# Patient Record
Sex: FEMALE
[De-identification: names, ages, dates, MRNs, and addresses within clinical notes are randomized per-mention and may not be internally consistent; named-entity substitution may affect disease eponyms.]

---

## 2022-03-27 ENCOUNTER — NURSE TRIAGE (OUTPATIENT)
Dept: OTHER | Facility: CLINIC | Age: 67
End: 2022-03-27

## 2022-03-27 NOTE — TELEPHONE ENCOUNTER
Subjective: Caller states \"nosebleed for the first time since I was 20. I have been having issues with sinuses recently\"     Hx HTN     BP checked during triage  152/111   138/112     Takes Amlodipine 2.5mg 1x a day 9    Current Symptoms:   bleeding from right nostril only. \"Maybe 1/2 cup blood loss\"    Onset: 5 minutes ago     Associated Symptoms: NA    Pain Severity: N/A    Temperature: N/A    What has been tried: pinch bridge of nose, cold washcloth     LMP: NA Pregnant: NA    Recommended disposition: See PCP within 24 Hours    Care advice provided, patient verbalizes understanding; denies any other questions or concerns; instructed to call back for any new or worsening symptoms. Patient/caller agrees to follow-up with PCP     This triage is a result of a call to [address] [place] [address]. Please do not respond to the triage nurse through this encounter. Any subsequent communication should be directly with the patient.     Reason for Disposition   [1] Large amount of blood has been lost (e.g., 1 cup or 240 ml) AND [2] bleeding now controlled (stopped)   Systolic BP  >= 561 OR Diastolic >= 387    Protocols used: NOSEBLEED-ADULT-AH, BLOOD PRESSURE - HIGH-ADULT-AH

## 2023-04-28 ENCOUNTER — OFFICE VISIT (OUTPATIENT)
Dept: PRIMARY CARE | Facility: CLINIC | Age: 68
End: 2023-04-28
Payer: MEDICARE

## 2023-04-28 VITALS
WEIGHT: 164 LBS | HEART RATE: 72 BPM | DIASTOLIC BLOOD PRESSURE: 80 MMHG | SYSTOLIC BLOOD PRESSURE: 134 MMHG | BODY MASS INDEX: 27.29 KG/M2

## 2023-04-28 DIAGNOSIS — J45.20 MILD INTERMITTENT ASTHMA, UNSPECIFIED WHETHER COMPLICATED (HHS-HCC): Primary | ICD-10-CM

## 2023-04-28 DIAGNOSIS — M25.562 ARTHRALGIA OF LEFT KNEE: ICD-10-CM

## 2023-04-28 DIAGNOSIS — E78.2 HYPERLIPEMIA, MIXED: ICD-10-CM

## 2023-04-28 PROBLEM — I35.9 AORTIC VALVE CALCIFICATION: Status: ACTIVE | Noted: 2023-04-28

## 2023-04-28 PROBLEM — I10 BENIGN ESSENTIAL HYPERTENSION: Status: ACTIVE | Noted: 2023-04-28

## 2023-04-28 PROBLEM — M25.569 JOINT PAIN, KNEE: Status: ACTIVE | Noted: 2023-04-28

## 2023-04-28 PROBLEM — J45.909 ASTHMA (HHS-HCC): Status: ACTIVE | Noted: 2023-04-28

## 2023-04-28 PROCEDURE — 1159F MED LIST DOCD IN RCRD: CPT | Performed by: INTERNAL MEDICINE

## 2023-04-28 PROCEDURE — 3075F SYST BP GE 130 - 139MM HG: CPT | Performed by: INTERNAL MEDICINE

## 2023-04-28 PROCEDURE — 1036F TOBACCO NON-USER: CPT | Performed by: INTERNAL MEDICINE

## 2023-04-28 PROCEDURE — 1160F RVW MEDS BY RX/DR IN RCRD: CPT | Performed by: INTERNAL MEDICINE

## 2023-04-28 PROCEDURE — 3079F DIAST BP 80-89 MM HG: CPT | Performed by: INTERNAL MEDICINE

## 2023-04-28 PROCEDURE — 99213 OFFICE O/P EST LOW 20 MIN: CPT | Performed by: INTERNAL MEDICINE

## 2023-04-28 SDOH — HEALTH STABILITY: PHYSICAL HEALTH: ON AVERAGE, HOW MANY DAYS PER WEEK DO YOU ENGAGE IN MODERATE TO STRENUOUS EXERCISE (LIKE A BRISK WALK)?: 3 DAYS

## 2023-04-28 SDOH — HEALTH STABILITY: PHYSICAL HEALTH: ON AVERAGE, HOW MANY MINUTES DO YOU ENGAGE IN EXERCISE AT THIS LEVEL?: 60 MIN

## 2023-04-28 ASSESSMENT — LIFESTYLE VARIABLES
HOW OFTEN DO YOU HAVE A DRINK CONTAINING ALCOHOL: MONTHLY OR LESS
HOW OFTEN DO YOU HAVE SIX OR MORE DRINKS ON ONE OCCASION: NEVER
AUDIT-C TOTAL SCORE: 1
SKIP TO QUESTIONS 9-10: 1
HOW MANY STANDARD DRINKS CONTAINING ALCOHOL DO YOU HAVE ON A TYPICAL DAY: 1 OR 2

## 2023-04-28 ASSESSMENT — PATIENT HEALTH QUESTIONNAIRE - PHQ9
2. FEELING DOWN, DEPRESSED OR HOPELESS: NOT AT ALL
SUM OF ALL RESPONSES TO PHQ9 QUESTIONS 1 & 2: 0
1. LITTLE INTEREST OR PLEASURE IN DOING THINGS: NOT AT ALL

## 2023-04-28 ASSESSMENT — ENCOUNTER SYMPTOMS: ARTHRALGIAS: 1

## 2023-04-28 NOTE — PROGRESS NOTES
Subjective   Patient ID: Stephany Lara is a 67 y.o. female who presents for No chief complaint on file..    Patient presents for follow-up..  She has been complaining of increased stress in her life.  She reports that her blood pressure has been slightly elevated.  She denies any headache no dizziness, no sinus problems, no chest pain or palpitations.  She denies abdominal pain no nausea vomiting or diarrhea.  She has been compliant with her diet and exercise.  She has been complaining of worsening asthma symptoms over the past few months.           Review of Systems   Musculoskeletal:  Positive for arthralgias.       Objective   /80   Pulse 72   Wt 74.4 kg (164 lb)   BMI 27.29 kg/m²     Physical Exam  Constitutional:       Appearance: Normal appearance.   Cardiovascular:      Rate and Rhythm: Normal rate and regular rhythm.      Heart sounds: No murmur heard.     No gallop.   Pulmonary:      Effort: No respiratory distress.      Breath sounds: No wheezing or rales.   Abdominal:      General: There is no distension.      Palpations: There is no mass.      Tenderness: There is no abdominal tenderness. There is no guarding.   Musculoskeletal:      Right lower leg: No edema.      Left lower leg: No edema.   Neurological:      Mental Status: She is alert.         Assessment/Plan   Diagnoses and all orders for this visit:  Mild intermittent asthma, unspecified whether complicated patient not interested in taking any steroids.  Will monitor symptoms.  Hypertension-stable off of medications  Arthralgia of left knee-symptoms have been stable  Hyperlipemia, mixed-she does not want medication.  Diet and exercise  Health maintenance-colonoscopy has been done.  Immunizations are up-to-date.  Mammogram has been done.  Bone density is up-to-date.  Pap with GYN

## 2023-04-28 NOTE — PATIENT INSTRUCTIONS
Please take medication as prescribed.  Diet and exercise.  Follow-up in 3 months.  Monitor your blood pressure at home.

## 2023-12-05 ENCOUNTER — TELEPHONE (OUTPATIENT)
Dept: PRIMARY CARE | Facility: CLINIC | Age: 68
End: 2023-12-05

## 2024-01-22 ENCOUNTER — OFFICE VISIT (OUTPATIENT)
Dept: PRIMARY CARE | Facility: CLINIC | Age: 69
End: 2024-01-22
Payer: MEDICARE

## 2024-01-22 VITALS
SYSTOLIC BLOOD PRESSURE: 134 MMHG | HEART RATE: 72 BPM | BODY MASS INDEX: 28.32 KG/M2 | HEIGHT: 65 IN | DIASTOLIC BLOOD PRESSURE: 76 MMHG | WEIGHT: 170 LBS

## 2024-01-22 DIAGNOSIS — E55.9 VITAMIN D DEFICIENCY: ICD-10-CM

## 2024-01-22 DIAGNOSIS — Z12.31 ENCOUNTER FOR SCREENING MAMMOGRAM FOR MALIGNANT NEOPLASM OF BREAST: Primary | ICD-10-CM

## 2024-01-22 DIAGNOSIS — Z78.0 MENOPAUSE: ICD-10-CM

## 2024-01-22 DIAGNOSIS — J45.20 MILD INTERMITTENT ASTHMA, UNSPECIFIED WHETHER COMPLICATED (HHS-HCC): ICD-10-CM

## 2024-01-22 DIAGNOSIS — E78.2 HYPERLIPEMIA, MIXED: ICD-10-CM

## 2024-01-22 DIAGNOSIS — I10 BENIGN ESSENTIAL HYPERTENSION: ICD-10-CM

## 2024-01-22 DIAGNOSIS — R53.81 MALAISE: ICD-10-CM

## 2024-01-22 PROCEDURE — 3075F SYST BP GE 130 - 139MM HG: CPT | Performed by: INTERNAL MEDICINE

## 2024-01-22 PROCEDURE — 3078F DIAST BP <80 MM HG: CPT | Performed by: INTERNAL MEDICINE

## 2024-01-22 PROCEDURE — G0439 PPPS, SUBSEQ VISIT: HCPCS | Performed by: INTERNAL MEDICINE

## 2024-01-22 PROCEDURE — 1159F MED LIST DOCD IN RCRD: CPT | Performed by: INTERNAL MEDICINE

## 2024-01-22 PROCEDURE — 1126F AMNT PAIN NOTED NONE PRSNT: CPT | Performed by: INTERNAL MEDICINE

## 2024-01-22 PROCEDURE — 1170F FXNL STATUS ASSESSED: CPT | Performed by: INTERNAL MEDICINE

## 2024-01-22 PROCEDURE — 1160F RVW MEDS BY RX/DR IN RCRD: CPT | Performed by: INTERNAL MEDICINE

## 2024-01-22 PROCEDURE — 1036F TOBACCO NON-USER: CPT | Performed by: INTERNAL MEDICINE

## 2024-01-22 PROCEDURE — 99214 OFFICE O/P EST MOD 30 MIN: CPT | Performed by: INTERNAL MEDICINE

## 2024-01-22 RX ORDER — AMLODIPINE BESYLATE 2.5 MG/1
1 TABLET ORAL DAILY
COMMUNITY
Start: 2020-11-12 | End: 2024-01-22 | Stop reason: WASHOUT

## 2024-01-22 ASSESSMENT — ENCOUNTER SYMPTOMS
BRUISES/BLEEDS EASILY: 0
PALPITATIONS: 0
DIARRHEA: 0
LIGHT-HEADEDNESS: 0
SEIZURES: 0
NAUSEA: 0
WHEEZING: 0
HEADACHES: 0
DYSURIA: 0
ABDOMINAL DISTENTION: 0
NUMBNESS: 0
EYE PAIN: 0
ADENOPATHY: 0
RECTAL PAIN: 0
EYE REDNESS: 0
WEAKNESS: 0
TREMORS: 0
CHOKING: 0
NECK PAIN: 0
FACIAL SWELLING: 0
BACK PAIN: 0
COLOR CHANGE: 0
SINUS PRESSURE: 0
DIFFICULTY URINATING: 0
VOMITING: 0
DIAPHORESIS: 0
FLANK PAIN: 0
HEMATURIA: 0
SHORTNESS OF BREATH: 0
ANAL BLEEDING: 0
STRIDOR: 0
FREQUENCY: 0
FATIGUE: 0
ARTHRALGIAS: 0
ACTIVITY CHANGE: 0
NECK STIFFNESS: 0
MYALGIAS: 0
SINUS PAIN: 0
WOUND: 0
CHEST TIGHTNESS: 0
BLOOD IN STOOL: 0
PHOTOPHOBIA: 0
CHILLS: 0
VOICE CHANGE: 0
POLYDIPSIA: 0
EYE ITCHING: 0
SLEEP DISTURBANCE: 0
APPETITE CHANGE: 0
SPEECH DIFFICULTY: 0
FACIAL ASYMMETRY: 0
COUGH: 0
TROUBLE SWALLOWING: 0
POLYPHAGIA: 0
RHINORRHEA: 0
DIZZINESS: 0
SORE THROAT: 0
JOINT SWELLING: 0
EYE DISCHARGE: 0
CONSTIPATION: 0
ABDOMINAL PAIN: 0

## 2024-01-22 ASSESSMENT — PATIENT HEALTH QUESTIONNAIRE - PHQ9
1. LITTLE INTEREST OR PLEASURE IN DOING THINGS: NOT AT ALL
2. FEELING DOWN, DEPRESSED OR HOPELESS: NOT AT ALL
SUM OF ALL RESPONSES TO PHQ9 QUESTIONS 1 AND 2: 0

## 2024-01-22 ASSESSMENT — ACTIVITIES OF DAILY LIVING (ADL)
DRESSING: INDEPENDENT
TAKING_MEDICATION: INDEPENDENT
BATHING: INDEPENDENT
DOING_HOUSEWORK: INDEPENDENT
GROCERY_SHOPPING: INDEPENDENT
MANAGING_FINANCES: INDEPENDENT
BATHING: INDEPENDENT
DRESSING: INDEPENDENT

## 2024-01-22 NOTE — PROGRESS NOTES
Subjective   Patient ID: Stephany Lara is a 68 y.o. female who presents for Medicare Annual Wellness Visit Subsequent.    Patient presents for wellness exam and follow-up.  She has not been taking her blood pressure medications.  She has been compliant with her diet and exercise.  She reports that her blood pressures been okay at home.  She denies any symptoms from asthma she overall feels well.  She reports occasional headaches, no dizziness, sinus problems, no chest pain or shortness of breath.  She denies abdominal pain no nausea vomiting or diarrhea.  She reports no new musculoskeletal complaints.         Review of Systems   Constitutional:  Negative for activity change, appetite change, chills, diaphoresis and fatigue.   HENT:  Negative for congestion, dental problem, drooling, ear discharge, ear pain, facial swelling, hearing loss, mouth sores, nosebleeds, postnasal drip, rhinorrhea, sinus pressure, sinus pain, sneezing, sore throat, tinnitus, trouble swallowing and voice change.    Eyes:  Negative for photophobia, pain, discharge, redness, itching and visual disturbance.   Respiratory:  Negative for cough, choking, chest tightness, shortness of breath, wheezing and stridor.    Cardiovascular:  Negative for chest pain, palpitations and leg swelling.   Gastrointestinal:  Negative for abdominal distention, abdominal pain, anal bleeding, blood in stool, constipation, diarrhea, nausea, rectal pain and vomiting.   Endocrine: Negative for cold intolerance, heat intolerance, polydipsia, polyphagia and polyuria.   Genitourinary:  Negative for decreased urine volume, difficulty urinating, dysuria, enuresis, flank pain, frequency, genital sores, hematuria and urgency.   Musculoskeletal:  Negative for arthralgias, back pain, gait problem, joint swelling, myalgias, neck pain and neck stiffness.   Skin:  Negative for color change, pallor, rash and wound.   Neurological:  Negative for dizziness, tremors, seizures,  "syncope, facial asymmetry, speech difficulty, weakness, light-headedness, numbness and headaches.   Hematological:  Negative for adenopathy. Does not bruise/bleed easily.   Psychiatric/Behavioral:  Negative for sleep disturbance.        Objective   /76   Pulse 72   Ht 1.651 m (5' 5\")   Wt 77.1 kg (170 lb)   BMI 28.29 kg/m²     Physical Exam  Constitutional:       Appearance: Normal appearance.   Cardiovascular:      Rate and Rhythm: Normal rate and regular rhythm.      Heart sounds: No murmur heard.     No gallop.   Pulmonary:      Effort: No respiratory distress.      Breath sounds: No wheezing or rales.   Abdominal:      General: There is no distension.      Palpations: There is no mass.      Tenderness: There is no abdominal tenderness. There is no guarding.   Musculoskeletal:      Right lower leg: No edema.      Left lower leg: No edema.   Neurological:      Mental Status: She is alert.         Assessment/Plan   Diagnoses and all orders for this visit:  Encounter for screening mammogram for malignant neoplasm of breast  -     BI mammo bilateral screening tomosynthesis; Future  Menopause  -     XR DEXA bone density; Future  Benign essential hypertension-stable off of medication.  She will monitor her blood pressure at home.  -     Uric Acid; Future  -     Urinalysis with Reflex Microscopic; Future  -     Albumin , Urine Random; Future  -     Comprehensive Metabolic Panel; Future  Hyperlipemia, mixed-we will check a fast lipid profile.  -     Lipid Panel; Future  Malaise  -     CBC and Auto Differential; Future  -     TSH with reflex to Free T4 if abnormal; Future  Vitamin D deficiency  -     Vitamin D 25-Hydroxy,Total (for eval of Vitamin D levels); Future  Mild intermittent asthma, unspecified whether complicated-stable symptoms.  Health maintenance-colonoscopy has been done.  She will get the RSV vaccine at the pharmacy's.  Will schedule a mammogram and bone density.  She will schedule an eye " appointment.  Dental appointment has been done..  GYN appointment for Pap.

## 2024-01-29 ENCOUNTER — LAB (OUTPATIENT)
Dept: LAB | Facility: LAB | Age: 69
End: 2024-01-29
Payer: MEDICARE

## 2024-01-29 DIAGNOSIS — E55.9 VITAMIN D DEFICIENCY: ICD-10-CM

## 2024-01-29 DIAGNOSIS — E78.2 HYPERLIPEMIA, MIXED: ICD-10-CM

## 2024-01-29 DIAGNOSIS — R53.81 MALAISE: ICD-10-CM

## 2024-01-29 DIAGNOSIS — I10 BENIGN ESSENTIAL HYPERTENSION: ICD-10-CM

## 2024-01-29 LAB
25(OH)D3 SERPL-MCNC: 64 NG/ML (ref 30–100)
ALBUMIN SERPL BCP-MCNC: 4.1 G/DL (ref 3.4–5)
ALP SERPL-CCNC: 58 U/L (ref 33–136)
ALT SERPL W P-5'-P-CCNC: 17 U/L (ref 7–45)
ANION GAP SERPL CALC-SCNC: 11 MMOL/L (ref 10–20)
APPEARANCE UR: ABNORMAL
AST SERPL W P-5'-P-CCNC: 19 U/L (ref 9–39)
BILIRUB SERPL-MCNC: 0.5 MG/DL (ref 0–1.2)
BILIRUB UR STRIP.AUTO-MCNC: NEGATIVE MG/DL
BUN SERPL-MCNC: 15 MG/DL (ref 6–23)
CALCIUM SERPL-MCNC: 9.9 MG/DL (ref 8.6–10.6)
CHLORIDE SERPL-SCNC: 105 MMOL/L (ref 98–107)
CHOLEST SERPL-MCNC: 232 MG/DL (ref 0–199)
CHOLESTEROL/HDL RATIO: 2.6
CO2 SERPL-SCNC: 27 MMOL/L (ref 21–32)
COLOR UR: YELLOW
CREAT SERPL-MCNC: 1.02 MG/DL (ref 0.5–1.05)
CREAT UR-MCNC: 9.9 MG/DL (ref 20–320)
EGFRCR SERPLBLD CKD-EPI 2021: 61 ML/MIN/1.73M*2
GLUCOSE SERPL-MCNC: 81 MG/DL (ref 74–99)
GLUCOSE UR STRIP.AUTO-MCNC: NEGATIVE MG/DL
HDLC SERPL-MCNC: 90.9 MG/DL
KETONES UR STRIP.AUTO-MCNC: NEGATIVE MG/DL
LDLC SERPL CALC-MCNC: 130 MG/DL
LEUKOCYTE ESTERASE UR QL STRIP.AUTO: NEGATIVE
MICROALBUMIN UR-MCNC: <7 MG/L
MICROALBUMIN/CREAT UR: ABNORMAL MG/G{CREAT}
NITRITE UR QL STRIP.AUTO: NEGATIVE
NON HDL CHOLESTEROL: 141 MG/DL (ref 0–149)
PH UR STRIP.AUTO: 5 [PH]
POTASSIUM SERPL-SCNC: 4.3 MMOL/L (ref 3.5–5.3)
PROT SERPL-MCNC: 7.5 G/DL (ref 6.4–8.2)
PROT UR STRIP.AUTO-MCNC: NEGATIVE MG/DL
RBC # UR STRIP.AUTO: NEGATIVE /UL
SODIUM SERPL-SCNC: 139 MMOL/L (ref 136–145)
SP GR UR STRIP.AUTO: 1.01
TRIGL SERPL-MCNC: 58 MG/DL (ref 0–149)
TSH SERPL-ACNC: 2.76 MIU/L (ref 0.44–3.98)
URATE SERPL-MCNC: 4.6 MG/DL (ref 2.3–6.7)
UROBILINOGEN UR STRIP.AUTO-MCNC: <2 MG/DL
VLDL: 12 MG/DL (ref 0–40)

## 2024-01-29 PROCEDURE — 82570 ASSAY OF URINE CREATININE: CPT

## 2024-01-29 PROCEDURE — 85025 COMPLETE CBC W/AUTO DIFF WBC: CPT

## 2024-01-29 PROCEDURE — 80061 LIPID PANEL: CPT

## 2024-01-29 PROCEDURE — 82043 UR ALBUMIN QUANTITATIVE: CPT

## 2024-01-29 PROCEDURE — 82306 VITAMIN D 25 HYDROXY: CPT

## 2024-01-29 PROCEDURE — 80053 COMPREHEN METABOLIC PANEL: CPT

## 2024-01-29 PROCEDURE — 36415 COLL VENOUS BLD VENIPUNCTURE: CPT

## 2024-01-29 PROCEDURE — 84550 ASSAY OF BLOOD/URIC ACID: CPT

## 2024-01-29 PROCEDURE — 81003 URINALYSIS AUTO W/O SCOPE: CPT

## 2024-01-29 PROCEDURE — 84443 ASSAY THYROID STIM HORMONE: CPT

## 2024-01-30 LAB
BASOPHILS # BLD AUTO: 0.03 X10*3/UL (ref 0–0.1)
BASOPHILS NFR BLD AUTO: 0.4 %
EOSINOPHIL # BLD AUTO: 0.19 X10*3/UL (ref 0–0.7)
EOSINOPHIL NFR BLD AUTO: 2.7 %
ERYTHROCYTE [DISTWIDTH] IN BLOOD BY AUTOMATED COUNT: 14.3 % (ref 11.5–14.5)
HCT VFR BLD AUTO: 36.2 % (ref 36–46)
HGB BLD-MCNC: 12.2 G/DL (ref 12–16)
IMM GRANULOCYTES # BLD AUTO: 0.02 X10*3/UL (ref 0–0.7)
IMM GRANULOCYTES NFR BLD AUTO: 0.3 % (ref 0–0.9)
LYMPHOCYTES # BLD AUTO: 2.19 X10*3/UL (ref 1.2–4.8)
LYMPHOCYTES NFR BLD AUTO: 31.5 %
MCH RBC QN AUTO: 32.6 PG (ref 26–34)
MCHC RBC AUTO-ENTMCNC: 33.7 G/DL (ref 32–36)
MCV RBC AUTO: 97 FL (ref 80–100)
MONOCYTES # BLD AUTO: 0.6 X10*3/UL (ref 0.1–1)
MONOCYTES NFR BLD AUTO: 8.6 %
NEUTROPHILS # BLD AUTO: 3.93 X10*3/UL (ref 1.2–7.7)
NEUTROPHILS NFR BLD AUTO: 56.5 %
NRBC BLD-RTO: 0 /100 WBCS (ref 0–0)
PLATELET # BLD AUTO: 196 X10*3/UL (ref 150–450)
RBC # BLD AUTO: 3.74 X10*6/UL (ref 4–5.2)
WBC # BLD AUTO: 7 X10*3/UL (ref 4.4–11.3)

## 2024-02-13 ENCOUNTER — HOSPITAL ENCOUNTER (OUTPATIENT)
Dept: RADIOLOGY | Facility: CLINIC | Age: 69
Discharge: HOME | End: 2024-02-13
Payer: MEDICARE

## 2024-02-13 ENCOUNTER — TELEPHONE (OUTPATIENT)
Dept: PRIMARY CARE | Facility: CLINIC | Age: 69
End: 2024-02-13

## 2024-02-13 DIAGNOSIS — Z12.31 ENCOUNTER FOR SCREENING MAMMOGRAM FOR MALIGNANT NEOPLASM OF BREAST: ICD-10-CM

## 2024-02-13 PROCEDURE — 77063 BREAST TOMOSYNTHESIS BI: CPT | Performed by: RADIOLOGY

## 2024-02-13 PROCEDURE — 77067 SCR MAMMO BI INCL CAD: CPT | Performed by: RADIOLOGY

## 2024-02-13 PROCEDURE — 77067 SCR MAMMO BI INCL CAD: CPT

## 2024-02-13 NOTE — TELEPHONE ENCOUNTER
Patient was looking at her chart and said that she was never told about  Aortic Valve Calcification.  She had a Cardiac Score on 12/21/21(around that time).  Can you please give her a call

## 2024-02-29 ENCOUNTER — HOSPITAL ENCOUNTER (OUTPATIENT)
Dept: RADIOLOGY | Facility: HOSPITAL | Age: 69
Discharge: HOME | End: 2024-02-29
Payer: MEDICARE

## 2024-02-29 ENCOUNTER — OFFICE VISIT (OUTPATIENT)
Dept: PRIMARY CARE | Facility: CLINIC | Age: 69
End: 2024-02-29
Payer: MEDICARE

## 2024-02-29 ENCOUNTER — OFFICE VISIT (OUTPATIENT)
Dept: ORTHOPEDIC SURGERY | Facility: HOSPITAL | Age: 69
End: 2024-02-29
Payer: MEDICARE

## 2024-02-29 VITALS
SYSTOLIC BLOOD PRESSURE: 134 MMHG | BODY MASS INDEX: 28.79 KG/M2 | DIASTOLIC BLOOD PRESSURE: 84 MMHG | HEART RATE: 72 BPM | WEIGHT: 173 LBS

## 2024-02-29 VITALS — WEIGHT: 160 LBS | HEIGHT: 65 IN | BODY MASS INDEX: 26.66 KG/M2

## 2024-02-29 DIAGNOSIS — E78.2 HYPERLIPEMIA, MIXED: ICD-10-CM

## 2024-02-29 DIAGNOSIS — J45.20 MILD INTERMITTENT ASTHMA, UNSPECIFIED WHETHER COMPLICATED (HHS-HCC): ICD-10-CM

## 2024-02-29 DIAGNOSIS — I10 BENIGN ESSENTIAL HYPERTENSION: Primary | ICD-10-CM

## 2024-02-29 DIAGNOSIS — S52.125A NONDISPLACED FRACTURE OF HEAD OF LEFT RADIUS, INITIAL ENCOUNTER FOR CLOSED FRACTURE: ICD-10-CM

## 2024-02-29 DIAGNOSIS — M25.522 LEFT ELBOW PAIN: ICD-10-CM

## 2024-02-29 DIAGNOSIS — S52.102S: ICD-10-CM

## 2024-02-29 PROCEDURE — 1126F AMNT PAIN NOTED NONE PRSNT: CPT | Performed by: EMERGENCY MEDICINE

## 2024-02-29 PROCEDURE — 99203 OFFICE O/P NEW LOW 30 MIN: CPT | Performed by: EMERGENCY MEDICINE

## 2024-02-29 PROCEDURE — 24650 CLTX RDL HEAD/NCK FX WO MNPJ: CPT | Performed by: EMERGENCY MEDICINE

## 2024-02-29 PROCEDURE — 1036F TOBACCO NON-USER: CPT | Performed by: INTERNAL MEDICINE

## 2024-02-29 PROCEDURE — 73080 X-RAY EXAM OF ELBOW: CPT | Mod: LEFT SIDE | Performed by: RADIOLOGY

## 2024-02-29 PROCEDURE — 1160F RVW MEDS BY RX/DR IN RCRD: CPT | Performed by: EMERGENCY MEDICINE

## 2024-02-29 PROCEDURE — 1159F MED LIST DOCD IN RCRD: CPT | Performed by: EMERGENCY MEDICINE

## 2024-02-29 PROCEDURE — 1036F TOBACCO NON-USER: CPT | Performed by: EMERGENCY MEDICINE

## 2024-02-29 PROCEDURE — 1126F AMNT PAIN NOTED NONE PRSNT: CPT | Performed by: INTERNAL MEDICINE

## 2024-02-29 PROCEDURE — 1160F RVW MEDS BY RX/DR IN RCRD: CPT | Performed by: INTERNAL MEDICINE

## 2024-02-29 PROCEDURE — 99213 OFFICE O/P EST LOW 20 MIN: CPT | Performed by: INTERNAL MEDICINE

## 2024-02-29 PROCEDURE — 1124F ACP DISCUSS-NO DSCNMKR DOCD: CPT | Performed by: INTERNAL MEDICINE

## 2024-02-29 PROCEDURE — 3079F DIAST BP 80-89 MM HG: CPT | Performed by: INTERNAL MEDICINE

## 2024-02-29 PROCEDURE — 99213 OFFICE O/P EST LOW 20 MIN: CPT | Performed by: EMERGENCY MEDICINE

## 2024-02-29 PROCEDURE — 1159F MED LIST DOCD IN RCRD: CPT | Performed by: INTERNAL MEDICINE

## 2024-02-29 PROCEDURE — 3075F SYST BP GE 130 - 139MM HG: CPT | Performed by: INTERNAL MEDICINE

## 2024-02-29 PROCEDURE — 73080 X-RAY EXAM OF ELBOW: CPT | Mod: LT

## 2024-02-29 RX ORDER — MELOXICAM 15 MG/1
15 TABLET ORAL DAILY
Qty: 30 TABLET | Refills: 1 | Status: SHIPPED | OUTPATIENT
Start: 2024-02-29 | End: 2024-02-29

## 2024-02-29 RX ORDER — MELOXICAM 15 MG/1
15 TABLET ORAL DAILY
Qty: 30 TABLET | Refills: 1 | Status: SHIPPED | OUTPATIENT
Start: 2024-02-29 | End: 2024-04-29

## 2024-02-29 ASSESSMENT — ENCOUNTER SYMPTOMS
JOINT SWELLING: 0
NUMBNESS: 0
SLEEP DISTURBANCE: 0
PALPITATIONS: 0
ACTIVITY CHANGE: 0
ABDOMINAL PAIN: 0
TREMORS: 0
CHEST TIGHTNESS: 0
EYE ITCHING: 0
FATIGUE: 0
EYE DISCHARGE: 0
BACK PAIN: 0
SORE THROAT: 0
VOMITING: 0
DIARRHEA: 0
SEIZURES: 0
TROUBLE SWALLOWING: 0
BLOOD IN STOOL: 0
ADENOPATHY: 0
FACIAL SWELLING: 0
ABDOMINAL DISTENTION: 0
SINUS PAIN: 0
WOUND: 0
COLOR CHANGE: 0
FREQUENCY: 0
SINUS PRESSURE: 0
SHORTNESS OF BREATH: 0
EYE PAIN: 0
EYE REDNESS: 0
NAUSEA: 0
DIZZINESS: 0
POLYPHAGIA: 0
ARTHRALGIAS: 1
DYSURIA: 0
FACIAL ASYMMETRY: 0
VOICE CHANGE: 0
DIAPHORESIS: 0
WEAKNESS: 0
WHEEZING: 0
ANAL BLEEDING: 0
CHILLS: 0
CONSTIPATION: 0
SPEECH DIFFICULTY: 0
APPETITE CHANGE: 0
LIGHT-HEADEDNESS: 0
HEMATURIA: 0
FLANK PAIN: 0
MYALGIAS: 0
CHOKING: 0
COUGH: 0
POLYDIPSIA: 0
BRUISES/BLEEDS EASILY: 0
NECK PAIN: 0
PHOTOPHOBIA: 0
STRIDOR: 0
RECTAL PAIN: 0
DIFFICULTY URINATING: 0
NECK STIFFNESS: 0
HEADACHES: 0
RHINORRHEA: 0

## 2024-02-29 ASSESSMENT — PATIENT HEALTH QUESTIONNAIRE - PHQ9
SUM OF ALL RESPONSES TO PHQ9 QUESTIONS 1 AND 2: 0
2. FEELING DOWN, DEPRESSED OR HOPELESS: NOT AT ALL
1. LITTLE INTEREST OR PLEASURE IN DOING THINGS: NOT AT ALL

## 2024-02-29 NOTE — PATIENT INSTRUCTIONS
Please monitor your blood pressure at home.  Report to the orthopedic clinic today.  Follow-up in 3 months.  Diet and exercise

## 2024-02-29 NOTE — PROGRESS NOTES
Subjective   Patient ID: Stephany Lara is a 68 y.o. female who presents for Hospital Follow-up.    Patient presents for follow-up.  She has been compliant with her diet and exercise.  She was seen in urgent care with the radial nondisplaced fracture.  She fell at the gym.  She is continues to complain of pain.  She denies any headaches, no dizziness, no chest pain or shortness of breath.  She denies abdominal pain no nausea vomiting or diarrhea.  She reports no other new musculoskeletal complaints.         Review of Systems   Constitutional:  Negative for activity change, appetite change, chills, diaphoresis and fatigue.   HENT:  Negative for congestion, dental problem, drooling, ear discharge, ear pain, facial swelling, hearing loss, mouth sores, nosebleeds, postnasal drip, rhinorrhea, sinus pressure, sinus pain, sneezing, sore throat, tinnitus, trouble swallowing and voice change.    Eyes:  Negative for photophobia, pain, discharge, redness, itching and visual disturbance.   Respiratory:  Negative for cough, choking, chest tightness, shortness of breath, wheezing and stridor.    Cardiovascular:  Negative for chest pain, palpitations and leg swelling.   Gastrointestinal:  Negative for abdominal distention, abdominal pain, anal bleeding, blood in stool, constipation, diarrhea, nausea, rectal pain and vomiting.   Endocrine: Negative for cold intolerance, heat intolerance, polydipsia, polyphagia and polyuria.   Genitourinary:  Negative for decreased urine volume, difficulty urinating, dysuria, enuresis, flank pain, frequency, genital sores, hematuria and urgency.   Musculoskeletal:  Positive for arthralgias. Negative for back pain, gait problem, joint swelling, myalgias, neck pain and neck stiffness.   Skin:  Negative for color change, pallor, rash and wound.   Neurological:  Negative for dizziness, tremors, seizures, syncope, facial asymmetry, speech difficulty, weakness, light-headedness, numbness and headaches.    Hematological:  Negative for adenopathy. Does not bruise/bleed easily.   Psychiatric/Behavioral:  Negative for sleep disturbance.        Objective   /84   Pulse 72   Wt 78.5 kg (173 lb)   BMI 28.79 kg/m²     Physical Exam  Constitutional:       Appearance: Normal appearance.   Cardiovascular:      Rate and Rhythm: Normal rate and regular rhythm.      Heart sounds: No murmur heard.     No gallop.   Pulmonary:      Effort: No respiratory distress.      Breath sounds: No wheezing or rales.   Abdominal:      General: There is no distension.      Palpations: There is no mass.      Tenderness: There is no abdominal tenderness. There is no guarding.   Musculoskeletal:      Right lower leg: No edema.      Left lower leg: No edema.      Comments: Left wrist with tenderness and mild swelling.   Neurological:      Mental Status: She is alert.         Assessment/Plan   Diagnoses and all orders for this visit:  Benign essential hypertension-she will monitor her blood pressure at home.  Follow a low-salt diet and exercise.  Hyperlipemia, mixed-we will continue with diet and exercise  Mild intermittent asthma, unspecified whether complicated-stable symptoms  Closed fracture of proximal end of left radius, unspecified-appointment with orthopedics today fracture morphology, sequela-orthopedic appointment today.  Health maintenance-mammogram has been done.  Colonoscopy is up-to-date.  Immunizations are up-to-date.  Bone density is pending..  Advance care planning discussed.  Eye and dental have been done

## 2024-02-29 NOTE — PROGRESS NOTES
Chief Complaint: Pain of the Left Elbow  History of Present Illness:  Encounter date: 02/29/2024  Stephany Lara is a 68 y.o. female who presents today for evaluation of left elbow pain.    Patient states the elbow pain started approximately 5 days ago.  She was at her gym and fell forward on an outstretched left hand.  [facility] where x-rays were performed and showed a left distal radius fracture.  She was unable to bring the x-rays with her to today's visit.  Since that visit she has been a left arm sling which has helped control her pain.  Today she localizes the pain to the outside of the left elbow.  She has tried Tylenol and sling with moderate improvement in pain.  The pain is worse with motion of the elbow including ADLs.  She denies numbness, tingling, weakness, swelling.      Problem List  Patient Active Problem List    Diagnosis Date Noted    Aortic valve calcification 04/28/2023    Asthma 04/28/2023    Benign essential hypertension 04/28/2023    Hyperlipemia, mixed 04/28/2023    Joint pain, knee 04/28/2023       Past Medical History  No past medical history on file.    Past Surgical History  Past Surgical History:   Procedure Laterality Date    BREAST BIOPSY Left 06/08/2015    Biopsy Breast Open    COLONOSCOPY  08/08/2018    Complete Colonoscopy    COLONOSCOPY  11/2022    [facility] 11-27       Social History  Social History     Socioeconomic History    Marital status: Single     Spouse name: Not on file    Number of children: Not on file    Years of education: Not on file    Highest education level: Not on file   Occupational History    Occupation:    Tobacco Use    Smoking status: Never    Smokeless tobacco: Never   Substance and Sexual Activity    Alcohol use: Not Currently    Drug use: Not Currently    Sexual activity: Not on file   Other Topics Concern    Not on file   Social History Narrative    Not on file     Social Determinants of Health     Financial  Resource Strain: Not on file   Food Insecurity: Not on file   Transportation Needs: Not on file   Physical Activity: Sufficiently Active (4/28/2023)    Exercise Vital Sign     Days of Exercise per Week: 3 days     Minutes of Exercise per Session: 60 min   Stress: Not on file   Social Connections: Not on file   Intimate Partner Violence: Not on file   Housing Stability: Not on file       Allergies  No Known Allergies    Medications  No current outpatient medications on file.     No current facility-administered medications for this visit.       Physical Exam:  Left elbow Exam  Elbow range of motion is limited in flexion by 10 degrees, extension by 15 degrees, supination by 5 degrees, pronation by 10 degrees. No elbow effusion. Tenderness to palpation over the radial head and proximal radius.  Strength in , wrist flexion, extension is weak compared to the right side.  Neurovascularly intact.    Imaging:  Radiographs/images of the left elbow obtained in the office today were reviewed and revealed left radial head fracture. Elbow effusion present.    The studies were reviewed with Dr. Prather personally in the office today.    Problem List Items Addressed This Visit    None    Patient Discussion/Summary  Left radial head fracture-patient suffered from a FOOSH 5 days ago while at the gym.  On physical exam she has tenderness palpation over the radial head, decreased range of motion in all planes at the elbow, and decreased strength secondary to pain.  X-rays of the left elbow show nondisplaced fracture through the radial head.  Continue use of sling for 1 more week.  After that work out of the sling with gentle range of motion exercises. Plan for follow-up in 2 weeks with repeat x-rays at that time. OT to be ordered after follow up.    Johnnie Stephens, DO  Primary Care Sports Medicine Fellow      ** Please excuse any errors in grammar or translation related to this dictation. Voice recognition software was utilized to  prepare this document. **

## 2024-03-18 ENCOUNTER — HOSPITAL ENCOUNTER (OUTPATIENT)
Dept: RADIOLOGY | Facility: HOSPITAL | Age: 69
Discharge: HOME | End: 2024-03-18
Payer: MEDICARE

## 2024-03-18 ENCOUNTER — OFFICE VISIT (OUTPATIENT)
Dept: ORTHOPEDIC SURGERY | Facility: HOSPITAL | Age: 69
End: 2024-03-18
Payer: MEDICARE

## 2024-03-18 VITALS — BODY MASS INDEX: 26.66 KG/M2 | WEIGHT: 160 LBS | HEIGHT: 65 IN

## 2024-03-18 DIAGNOSIS — S52.125A NONDISPLACED FRACTURE OF HEAD OF LEFT RADIUS, INITIAL ENCOUNTER FOR CLOSED FRACTURE: ICD-10-CM

## 2024-03-18 PROCEDURE — 73080 X-RAY EXAM OF ELBOW: CPT | Mod: LT

## 2024-03-18 PROCEDURE — 99213 OFFICE O/P EST LOW 20 MIN: CPT | Performed by: EMERGENCY MEDICINE

## 2024-03-18 PROCEDURE — 73080 X-RAY EXAM OF ELBOW: CPT | Mod: LEFT SIDE | Performed by: RADIOLOGY

## 2024-03-18 PROCEDURE — 1160F RVW MEDS BY RX/DR IN RCRD: CPT | Performed by: EMERGENCY MEDICINE

## 2024-03-18 PROCEDURE — 99024 POSTOP FOLLOW-UP VISIT: CPT | Performed by: EMERGENCY MEDICINE

## 2024-03-18 PROCEDURE — 1036F TOBACCO NON-USER: CPT | Performed by: EMERGENCY MEDICINE

## 2024-03-18 PROCEDURE — 1159F MED LIST DOCD IN RCRD: CPT | Performed by: EMERGENCY MEDICINE

## 2024-03-18 ASSESSMENT — PAIN - FUNCTIONAL ASSESSMENT: PAIN_FUNCTIONAL_ASSESSMENT: 0-10

## 2024-03-18 ASSESSMENT — PAIN SCALES - GENERAL: PAINLEVEL_OUTOF10: 0 - NO PAIN

## 2024-03-18 NOTE — PROGRESS NOTES
Chief Complaint: Pain of the Left Elbow (Radial head fracture, seen in [facility] 02/29/24)  History of Present Illness:  Encounter date: 03/18/2024  Stephany Lara is a 68 y.o. female who presents today for evaluation of left elbow pain.    3/18/24: Patient returns today for reevaluation for left elbow pain.  Overall, she feels that her pain has improved since her last visit.  She does continue to have stiffness and soreness with elbow extension and supination.  She did discontinue using the shoulder sling a couple days ago.  She has been using oral meloxicam for pain control.    2/29/24: Patient states the elbow pain started approximately 5 days ago.  She was at her gym and fell forward on an outstretched left hand.  [facility] day where x-rays were performed and showed a left distal radius fracture.  She was unable to bring the x-rays with her to today's visit.  Since that visit she has been a left arm sling which has helped control her pain.  Today she localizes the pain to the outside of the left elbow.  She has tried Tylenol and sling with moderate improvement in pain.  The pain is worse with motion of the elbow including ADLs.  She denies numbness, tingling, weakness, swelling.      Problem List  Patient Active Problem List    Diagnosis Date Noted    Aortic valve calcification 04/28/2023    Asthma 04/28/2023    Benign essential hypertension 04/28/2023    Hyperlipemia, mixed 04/28/2023    Joint pain, knee 04/28/2023       Past Medical History  No past medical history on file.    Past Surgical History  Past Surgical History:   Procedure Laterality Date    BREAST BIOPSY Left 06/08/2015    Biopsy Breast Open    COLONOSCOPY  08/08/2018    Complete Colonoscopy    COLONOSCOPY  11/2022    [facility]pt 11-27       Social History  Social History     Socioeconomic History    Marital status: Single     Spouse name: Not on file    Number of children: Not on file    Years of education: Not on file     Highest education level: Not on file   Occupational History    Occupation:    Tobacco Use    Smoking status: Never    Smokeless tobacco: Never   Vaping Use    Vaping Use: Never used   Substance and Sexual Activity    Alcohol use: Not Currently    Drug use: Not Currently    Sexual activity: Not on file   Other Topics Concern    Not on file   Social History Narrative    Not on file     Social Determinants of Health     Financial Resource Strain: Not on file   Food Insecurity: Not on file   Transportation Needs: Not on file   Physical Activity: Sufficiently Active (4/28/2023)    Exercise Vital Sign     Days of Exercise per Week: 3 days     Minutes of Exercise per Session: 60 min   Stress: Not on file   Social Connections: Not on file   Intimate Partner Violence: Not on file   Housing Stability: Not on file       Allergies  No Known Allergies    Medications  Current Outpatient Medications   Medication Sig Dispense Refill    meloxicam (Mobic) 15 mg tablet Take 1 tablet (15 mg) by mouth once daily. 30 tablet 1     No current facility-administered medications for this visit.       Physical Exam:  Left elbow Exam  No swelling.  No erythema.  No deformity.  Full flexion and pronation.  Extension limited to 30.  Supination limited to 20.  No crepitus.  Median, ulnar, and radial nerves intact distally.  Gross sensation intact.  Muscle strength testing deferred.    Imaging:  Radiographs/images of the left elbow obtained in the office today were reviewed and revealed resolution of elbow effusion.  Callus formation present at the radial neck.    Problem List Items Addressed This Visit    None  Visit Diagnoses         Codes    Nondisplaced fracture of head of left radius, initial encounter for closed fracture     S52.125A    Relevant Orders    XR elbow left 3+ views    Referral to Occupational Therapy    Referral to Physical Therapy          Patient Discussion/Summary  Overall, I feel that she is doing well.  I discussed the  patient that we need to begin aggressive hand therapy to work on strength and range of motion.  I would like for her to continue to completely discontinue using the sling.  She can continue meloxicam as needed.  I would like to see her back in 4 weeks for reevaluation.    Jose Prather,   Sports Medicine  [facility] [place]    ** Please excuse any errors in grammar or translation related to this dictation. Voice recognition software was utilized to prepare this document. **

## 2024-03-27 ENCOUNTER — APPOINTMENT (OUTPATIENT)
Dept: OCCUPATIONAL THERAPY | Facility: HOSPITAL | Age: 69
End: 2024-03-27
Payer: MEDICARE

## 2024-04-08 ENCOUNTER — HOSPITAL ENCOUNTER (OUTPATIENT)
Dept: RADIOLOGY | Facility: CLINIC | Age: 69
Discharge: HOME | End: 2024-04-08
Payer: MEDICARE

## 2024-04-08 DIAGNOSIS — Z78.0 MENOPAUSE: ICD-10-CM

## 2024-04-08 PROCEDURE — 77080 DXA BONE DENSITY AXIAL: CPT | Performed by: RADIOLOGY

## 2024-04-08 PROCEDURE — 77080 DXA BONE DENSITY AXIAL: CPT

## 2024-04-09 ENCOUNTER — EVALUATION (OUTPATIENT)
Dept: OCCUPATIONAL THERAPY | Facility: HOSPITAL | Age: 69
End: 2024-04-09
Payer: MEDICARE

## 2024-04-09 DIAGNOSIS — S52.125A NONDISPLACED FRACTURE OF HEAD OF LEFT RADIUS, INITIAL ENCOUNTER FOR CLOSED FRACTURE: Primary | ICD-10-CM

## 2024-04-09 PROCEDURE — 97165 OT EVAL LOW COMPLEX 30 MIN: CPT | Mod: GO | Performed by: OCCUPATIONAL THERAPIST

## 2024-04-09 ASSESSMENT — PAIN SCALES - GENERAL: PAINLEVEL_OUTOF10: 5 - MODERATE PAIN

## 2024-04-09 ASSESSMENT — ENCOUNTER SYMPTOMS
LOSS OF SENSATION IN FEET: 0
DEPRESSION: 0
OCCASIONAL FEELINGS OF UNSTEADINESS: 0

## 2024-04-09 ASSESSMENT — PAIN - FUNCTIONAL ASSESSMENT: PAIN_FUNCTIONAL_ASSESSMENT: 0-10

## 2024-04-09 NOTE — PROGRESS NOTES
"    Occupational Therapy  Occupational Therapy Orthopedic Evaluation    Patient Name: Stephany Lara  MRN: [id number]  Today's Date: 4/9/2024  Time Calculation  Start Time: 1455  Stop Time: 1530  Time Calculation (min): 35 min      Time:  Time Calculation  Start Time: 1455  Stop Time: 1530  Time Calculation (min): 35 min  OT Evaluation Time Entry  OT Evaluation (Low) Time Entry: 35    Insurance:  Visit number: 1 of 12  Authorization info: Eval only, needs auth for [place]  Insurance Type: Payor: HUMANA MEDICARE / Plan: HUMANA GOLD CHOICE / Product Type: *No Product type* /      General:  Reason for visit: L non-displace FX of L radius head  Referred by: Yvonne    Current Problem  1. Nondisplaced fracture of head of left radius, initial encounter for closed fracture  Referral to Occupational Therapy    Follow Up In Occupational Therapy          Precautions: none         Medical History Form: Reviewed (scanned into chart)    Subjective:   Chief Complaint: \"My elbow is so stiff.\"  Onset: 3/15/24. Patient reports falling at the gym resulting in a L radius head non-displaced fracture.   SHERRY: Fall   DOI/DOS: 3/15/24=fall; no surgical intervention at this time.       Hand Dominance: Right    Current Condition since injury:   better      PAIN  Pain Assessment: 0-10  Pain Score: 5 - Moderate pain  Pain Type: Acute pain  Pain Location: Elbow  Pain Orientation: Left  Aggravating Factors: Opening jars/containers and Lifting/Carrying   Relieving Factors: Rest     Relevant Information (PMH & Previous Tests/Imaging): x-ray=+radial head fracture  Previous Interventions/Treatments: None     Prior Level of Function (PLOF)  Exercise/Physical Activity: Patient reports being independent with all ADL's prior to fall.   Work/School: Patient is employed full-time as a self-employed .   Current ADL/IADL Status: All ADL's involving lifting or resistive use of L UE are impaired.      Patients Living Environment: Reviewed and no concern. " "Patient lives alones and has family that lives nearby.     Primary Language: English    Pt goals for therapy: \"Full motion and strength\" of L elbow/FA.     Red Flags: Do you have any of the following? No  Fever/chills, unexplained weight changes, dizziness/fainting, unexplained change in bowel or bladder functions, unexplained malaise or muscle weakness, night pain/sweats, numbness or tingling    Objective:    Evaluation Complexity=low    Left Hand AROM (degrees)   MCP PIP DIP DPC   IF  WFL WFL WFL 0   MF WFL WFL WFL 0   RF WFl WFL WFL 0   SF WFL WFL WFL 0   Thumb WFL  WFL    Thumb RABD WFL      Thumb PABD WFL         AROM: L FA sup/pron=70/80, R FA sup/pron=85/80    L elbow E/F=-20/145, R elbow E/F=0/150    L wrist E/F=WFL      Outcome Measures:  UEFI: 58/80    EDUCATION: home exercise program, plan of care, activity modifications, pain management, and injury pathology       Goals:  Active       OT Goals       Patient c/o 2/10 or less pain in L elbow with use during ADL's and home exercises.        Start:  04/09/24    Expected End:  05/09/24            Patient is independent with entire HEP.        Start:  04/09/24    Expected End:  05/09/24            AROM: L elbow extension=0 to assist with ADL completion.        Start:  04/09/24    Expected End:  05/24/24            L UE AROM is sufficient for independent completion of all ADL's and home exercises.        Start:  04/09/24    Expected End:  06/08/24            L UE strength is sufficient for independent completion of all ADL's and home management.        Start:  04/09/24    Expected End:  06/08/24                Plan of care was developed with input and agreement by the patient    Treatments:       Therapeutic Exercise:    min  Therapeutic Exercise  Therapeutic Exercise Performed: Yes  Therapeutic Exercise Activity 1: elevated elbow E/F x 10 reps each with thumb up, palm up, and palm down  Therapeutic Exercise Activity 2: FA sup/pron x 10      Assessment: Patient " is a 69 yo female  s/p L non-displaced radial head fx resulting in limited participation in pain-free ADLs and inability to perform at their prior level of function. Pt would benefit from occupational therapy to address the impairments found & listed previously in the objective section in order to return to safe and pain-free ADLs and prior level of function.       Plan:      Planned Interventions include: therapeutic exercise, therapeutic activity, self-care home management, manual therapy, therapeutic activities, gait training, neuromuscular coordination, vasopneumatic, dry needling, aquatic therapy, electric stimulation, fluidotherapy, ultrasound, kinesiotaping, orthosis fabrication, wound care  Frequency: 1-2 x Week  Duration: 8 Weeks    Rachel Hill, OT

## 2024-04-18 ENCOUNTER — OFFICE VISIT (OUTPATIENT)
Dept: ORTHOPEDIC SURGERY | Facility: HOSPITAL | Age: 69
End: 2024-04-18
Payer: MEDICARE

## 2024-04-18 DIAGNOSIS — S52.125A NONDISPLACED FRACTURE OF HEAD OF LEFT RADIUS, INITIAL ENCOUNTER FOR CLOSED FRACTURE: Primary | ICD-10-CM

## 2024-04-18 PROCEDURE — 99213 OFFICE O/P EST LOW 20 MIN: CPT | Performed by: EMERGENCY MEDICINE

## 2024-04-18 PROCEDURE — 1159F MED LIST DOCD IN RCRD: CPT | Performed by: EMERGENCY MEDICINE

## 2024-04-18 PROCEDURE — 1160F RVW MEDS BY RX/DR IN RCRD: CPT | Performed by: EMERGENCY MEDICINE

## 2024-04-18 PROCEDURE — 99024 POSTOP FOLLOW-UP VISIT: CPT | Performed by: EMERGENCY MEDICINE

## 2024-04-18 NOTE — PROGRESS NOTES
Chief Complaint: No chief complaint on file.  History of Present Illness:  Encounter date: 04/18/2024  Stephany Lara is a 68 y.o. female who presents today for evaluation of left elbow pain.    4/18/24: Patient returns today for reevaluation for left elbow pain.  Since her last visit, she has been working with hand therapy.  She does feel that she is doing quite well.  Her pain has completely resolved.  Her primary complaint this time is continued stiffness and lacking full extension.    3/18/24: Patient returns today for reevaluation for left elbow pain.  Overall, she feels that her pain has improved since her last visit.  She does continue to have stiffness and soreness with elbow extension and supination.  She did discontinue using the shoulder sling a couple days ago.  She has been using oral meloxicam for pain control.    2/29/24: Patient states the elbow pain started approximately 5 days ago.  She was at her gym and fell forward on an outstretched left hand.  [facility] where x-rays were performed and showed a left distal radius fracture.  She was unable to bring the x-rays with her to today's visit.  Since that visit she has been a left arm sling which has helped control her pain.  Today she localizes the pain to the outside of the left elbow.  She has tried Tylenol and sling with moderate improvement in pain.  The pain is worse with motion of the elbow including ADLs.  She denies numbness, tingling, weakness, swelling.      Problem List  Patient Active Problem List    Diagnosis Date Noted    Closed nondisplaced fracture of head of left radius 04/09/2024    Aortic valve calcification 04/28/2023    Asthma ([facility]) 04/28/2023    Benign essential hypertension 04/28/2023    Hyperlipemia, mixed 04/28/2023    Joint pain, knee 04/28/2023       Past Medical History  No past medical history on file.    Past Surgical History  Past Surgical History:   Procedure Laterality Date     BREAST BIOPSY Left 06/08/2015    Biopsy Breast Open    COLONOSCOPY  08/08/2018    Complete Colonoscopy    COLONOSCOPY  11/2022    rpt 11-27       Social History  Social History     Socioeconomic History    Marital status: Single     Spouse name: Not on file    Number of children: Not on file    Years of education: Not on file    Highest education level: Not on file   Occupational History    Occupation:    Tobacco Use    Smoking status: Never    Smokeless tobacco: Never   Vaping Use    Vaping status: Never Used   Substance and Sexual Activity    Alcohol use: Not Currently    Drug use: Not Currently    Sexual activity: Not on file   Other Topics Concern    Not on file   Social History Narrative    Not on file     Social Determinants of Health     Financial Resource Strain: Not on file   Food Insecurity: Not on file   Transportation Needs: Not on file   Physical Activity: Sufficiently Active (4/28/2023)    Exercise Vital Sign     Days of Exercise per Week: 3 days     Minutes of Exercise per Session: 60 min   Stress: Not on file   Social Connections: Not on file   Intimate Partner Violence: Not on file   Housing Stability: Not on file       Allergies  No Known Allergies    Medications  Current Outpatient Medications   Medication Sig Dispense Refill    meloxicam (Mobic) 15 mg tablet Take 1 tablet (15 mg) by mouth once daily. 30 tablet 1     No current facility-administered medications for this visit.       Physical Exam:  Left elbow Exam  No swelling.  No erythema.  No deformity.  Full flexion and pronation.  Extension limited to 10.  Supination full.  No crepitus.  Median, ulnar, and radial nerves intact distally.  Gross sensation intact.  Muscle strength testing deferred.  Negative valgus stress.  Negative varus stress.      Problem List Items Addressed This Visit    None  Visit Diagnoses         Codes    Nondisplaced fracture of head of left radius, initial encounter for closed fracture    -  Primary S52.125A             Patient Discussion/Summary  Overall, I feel that she is doing well.  She has no tenderness or pain.  She has near full range of motion with the exception of limited extension.  For this reason, I discussed with patient that she should continue to work with hand therapy.  She does have additional visits scheduled.  I would like for her to continue to progress to all activities without restriction.  She will follow-up as needed if pain returns or if she continues to have significant stiffness.    Jose Prather,   Sports Medicine  [facility], [facility] [place]    ** Please excuse any errors in grammar or translation related to this dictation. Voice recognition software was utilized to prepare this document. **

## 2024-04-19 ENCOUNTER — TREATMENT (OUTPATIENT)
Dept: OCCUPATIONAL THERAPY | Facility: HOSPITAL | Age: 69
End: 2024-04-19
Payer: MEDICARE

## 2024-04-19 DIAGNOSIS — S52.125A NONDISPLACED FRACTURE OF HEAD OF LEFT RADIUS, INITIAL ENCOUNTER FOR CLOSED FRACTURE: ICD-10-CM

## 2024-04-19 PROCEDURE — 97110 THERAPEUTIC EXERCISES: CPT | Mod: GO | Performed by: OCCUPATIONAL THERAPIST

## 2024-04-19 ASSESSMENT — PAIN SCALES - GENERAL: PAINLEVEL_OUTOF10: 0 - NO PAIN

## 2024-04-19 ASSESSMENT — PAIN - FUNCTIONAL ASSESSMENT: PAIN_FUNCTIONAL_ASSESSMENT: 0-10

## 2024-04-19 NOTE — PROGRESS NOTES
"    Occupational Therapy  Occupational Therapy Treatment    Patient Name: Stephany Lara  MRN: [id number]  Today's Date: 4/19/2024  Time Calculation  Start Time: 1415  Stop Time: 1500  Time Calculation (min): 45 min      Time:  Time Calculation  Start Time: 1415  Stop Time: 1500  Time Calculation (min): 45 min  OT Therapeutic Procedures Time Entry  Therapeutic Exercise Time Entry: 40    Insurance:  Visit number: 1 of 10  Authorization info: 10 authorized visits  Insurance Type: Payor: HUMANA MEDICARE / Plan: HUMANA GOLD CHOICE / Product Type: *No Product type* /    General:  Reason for visit: L Radial Head FX.  Referred by: Quyen     Current Problem  1. Nondisplaced fracture of head of left radius, initial encounter for closed fracture  Follow Up In Occupational Therapy          Precautions   None       Subjective:   Patient reports \"I feel like I can do more but I am still stiff.\"    Performing HEP?: Yes    Pain  Pain Assessment: 0-10  Pain Score: 0 - No pain    Objective:     AROM: L elbow E/F=-6/135    Physical Observation: intact   Edema: none    Sensory: intact   Numbness/Tingling: none     Treatments:     Modalities:      5 min  Modalities  Modalities Used: Yes  Modality 1: Untimed Hotpack    Therapeutic Exercise:   40 min  Therapeutic Exercise  Therapeutic Exercise Performed: Yes  Therapeutic Exercise Activity 1: elevated elbow E/F x 10 reps each with thumb up, palm up, and palm down  Therapeutic Exercise Activity 2: FA sup/pron x 10 reps each  Therapeutic Exercise Activity 3: Wrist E/F with FA sup/pron x 10 reps each  Therapeutic Exercise Activity 4: juxacizer x 5 reps      Assessment: Improvement is noted with L elbow extension this p.m.        Plan: continue with plan of care 1-2x/wk.       Rachel Hill, OT  "

## 2024-04-23 ENCOUNTER — OFFICE VISIT (OUTPATIENT)
Dept: PRIMARY CARE | Facility: CLINIC | Age: 69
End: 2024-04-23
Payer: MEDICARE

## 2024-04-23 VITALS
BODY MASS INDEX: 28.39 KG/M2 | HEART RATE: 80 BPM | DIASTOLIC BLOOD PRESSURE: 74 MMHG | WEIGHT: 170.6 LBS | SYSTOLIC BLOOD PRESSURE: 122 MMHG

## 2024-04-23 DIAGNOSIS — I10 BENIGN ESSENTIAL HYPERTENSION: ICD-10-CM

## 2024-04-23 DIAGNOSIS — E78.2 HYPERLIPEMIA, MIXED: Primary | ICD-10-CM

## 2024-04-23 DIAGNOSIS — J45.20 MILD INTERMITTENT ASTHMA, UNSPECIFIED WHETHER COMPLICATED (HHS-HCC): ICD-10-CM

## 2024-04-23 PROCEDURE — 99213 OFFICE O/P EST LOW 20 MIN: CPT | Performed by: INTERNAL MEDICINE

## 2024-04-23 PROCEDURE — G2211 COMPLEX E/M VISIT ADD ON: HCPCS | Performed by: INTERNAL MEDICINE

## 2024-04-23 PROCEDURE — 1036F TOBACCO NON-USER: CPT | Performed by: INTERNAL MEDICINE

## 2024-04-23 PROCEDURE — 3074F SYST BP LT 130 MM HG: CPT | Performed by: INTERNAL MEDICINE

## 2024-04-23 PROCEDURE — 1160F RVW MEDS BY RX/DR IN RCRD: CPT | Performed by: INTERNAL MEDICINE

## 2024-04-23 PROCEDURE — 1126F AMNT PAIN NOTED NONE PRSNT: CPT | Performed by: INTERNAL MEDICINE

## 2024-04-23 PROCEDURE — 1159F MED LIST DOCD IN RCRD: CPT | Performed by: INTERNAL MEDICINE

## 2024-04-23 PROCEDURE — 3078F DIAST BP <80 MM HG: CPT | Performed by: INTERNAL MEDICINE

## 2024-04-23 ASSESSMENT — ENCOUNTER SYMPTOMS
ACTIVITY CHANGE: 0
DIFFICULTY URINATING: 0
TREMORS: 0
DYSURIA: 0
SLEEP DISTURBANCE: 0
FATIGUE: 0
RECTAL PAIN: 0
EYE PAIN: 0
HEADACHES: 0
BACK PAIN: 0
LIGHT-HEADEDNESS: 0
SINUS PAIN: 0
PALPITATIONS: 0
ARTHRALGIAS: 0
WOUND: 0
FLANK PAIN: 0
SINUS PRESSURE: 0
CHEST TIGHTNESS: 0
NECK STIFFNESS: 0
APPETITE CHANGE: 0
EYE DISCHARGE: 0
POLYPHAGIA: 0
JOINT SWELLING: 0
PHOTOPHOBIA: 0
BLOOD IN STOOL: 0
NAUSEA: 0
VOICE CHANGE: 0
EYE REDNESS: 0
FACIAL SWELLING: 0
CONSTIPATION: 0
ABDOMINAL DISTENTION: 0
FREQUENCY: 0
FACIAL ASYMMETRY: 0
CHOKING: 0
VOMITING: 0
DIZZINESS: 0
RHINORRHEA: 0
TROUBLE SWALLOWING: 0
MYALGIAS: 0
SORE THROAT: 0
SHORTNESS OF BREATH: 0
NUMBNESS: 0
EYE ITCHING: 0
BRUISES/BLEEDS EASILY: 0
STRIDOR: 0
DIARRHEA: 0
ANAL BLEEDING: 0
WEAKNESS: 0
CHILLS: 0
WHEEZING: 0
ADENOPATHY: 0
HEMATURIA: 0
COLOR CHANGE: 0
POLYDIPSIA: 0
ABDOMINAL PAIN: 0
NECK PAIN: 0
COUGH: 0
SPEECH DIFFICULTY: 0
DIAPHORESIS: 0
SEIZURES: 0

## 2024-04-23 ASSESSMENT — PAIN SCALES - GENERAL: PAINLEVEL: 0-NO PAIN

## 2024-04-23 NOTE — PROGRESS NOTES
Subjective   Patient ID: Stephany Lara is a 68 y.o. female who presents for Follow-up (Blood pressure).    Patient presents for follow-up.  She has been compliant with her  diet and exercise.  She reports that her blood pressures been normal at home.  She reports improvement in her arm pain.  She denies any headaches, no dizziness, no chest pain or shortness of breath.  She denies abdominal pain nausea vomiting or diarrhea.  She reports no other new musculoskeletal complaints.         Review of Systems   Constitutional:  Negative for activity change, appetite change, chills, diaphoresis and fatigue.   HENT:  Negative for congestion, dental problem, drooling, ear discharge, ear pain, facial swelling, hearing loss, mouth sores, nosebleeds, postnasal drip, rhinorrhea, sinus pressure, sinus pain, sneezing, sore throat, tinnitus, trouble swallowing and voice change.    Eyes:  Negative for photophobia, pain, discharge, redness, itching and visual disturbance.   Respiratory:  Negative for cough, choking, chest tightness, shortness of breath, wheezing and stridor.    Cardiovascular:  Negative for chest pain, palpitations and leg swelling.   Gastrointestinal:  Negative for abdominal distention, abdominal pain, anal bleeding, blood in stool, constipation, diarrhea, nausea, rectal pain and vomiting.   Endocrine: Negative for cold intolerance, heat intolerance, polydipsia, polyphagia and polyuria.   Genitourinary:  Negative for decreased urine volume, difficulty urinating, dysuria, enuresis, flank pain, frequency, genital sores, hematuria and urgency.   Musculoskeletal:  Negative for arthralgias, back pain, gait problem, joint swelling, myalgias, neck pain and neck stiffness.   Skin:  Negative for color change, pallor, rash and wound.   Neurological:  Negative for dizziness, tremors, seizures, syncope, facial asymmetry, speech difficulty, weakness, light-headedness, numbness and headaches.   Hematological:  Negative for  adenopathy. Does not bruise/bleed easily.   Psychiatric/Behavioral:  Negative for sleep disturbance.        Objective   /74   Pulse 80   Wt 77.4 kg (170 lb 9.6 oz)   BMI 28.39 kg/m²     Physical Exam  Constitutional:       Appearance: Normal appearance.   Cardiovascular:      Rate and Rhythm: Normal rate and regular rhythm.      Heart sounds: No murmur heard.     No gallop.   Pulmonary:      Effort: No respiratory distress.      Breath sounds: No wheezing or rales.   Abdominal:      General: There is no distension.      Palpations: There is no mass.      Tenderness: There is no abdominal tenderness. There is no guarding.   Musculoskeletal:      Right lower leg: No edema.      Left lower leg: No edema.   Neurological:      Mental Status: She is alert.         Assessment/Plan   Diagnoses and all orders for this visit:  Hyperlipemia, mixed-she will diet and exercise.  She does not want medications.  Benign essential hypertension-stable off of medication  Mild intermittent asthma, unspecified whether complicated ([facility])-she denies any symptoms.  Health maintenance-immunizations are up-to-date.  Colonoscopy has been done.  Mammogram is up-to-date.  Bone density has been done.  Pap with GYN.  She will schedule an eye and dental appointment

## 2024-04-26 ENCOUNTER — APPOINTMENT (OUTPATIENT)
Dept: OCCUPATIONAL THERAPY | Facility: HOSPITAL | Age: 69
End: 2024-04-26
Payer: MEDICARE

## 2024-04-26 ENCOUNTER — DOCUMENTATION (OUTPATIENT)
Dept: OCCUPATIONAL THERAPY | Facility: HOSPITAL | Age: 69
End: 2024-04-26
Payer: MEDICARE

## 2024-04-29 ENCOUNTER — APPOINTMENT (OUTPATIENT)
Dept: OBSTETRICS AND GYNECOLOGY | Facility: CLINIC | Age: 69
End: 2024-04-29
Payer: MEDICARE

## 2024-04-30 ENCOUNTER — TREATMENT (OUTPATIENT)
Dept: OCCUPATIONAL THERAPY | Facility: HOSPITAL | Age: 69
End: 2024-04-30
Payer: MEDICARE

## 2024-04-30 DIAGNOSIS — S52.125A NONDISPLACED FRACTURE OF HEAD OF LEFT RADIUS, INITIAL ENCOUNTER FOR CLOSED FRACTURE: ICD-10-CM

## 2024-04-30 PROCEDURE — 97110 THERAPEUTIC EXERCISES: CPT | Mod: GO | Performed by: OCCUPATIONAL THERAPIST

## 2024-04-30 ASSESSMENT — PAIN SCALES - GENERAL: PAINLEVEL_OUTOF10: 0 - NO PAIN

## 2024-04-30 ASSESSMENT — PAIN - FUNCTIONAL ASSESSMENT: PAIN_FUNCTIONAL_ASSESSMENT: 0-10

## 2024-04-30 NOTE — PROGRESS NOTES
"    Occupational Therapy  Occupational Therapy Treatment    Patient Name: Stephany Lara  MRN: [id number]  Today's Date: 4/30/2024  Time Calculation  Start Time: 1500  Stop Time: 1555  Time Calculation (min): 55 min        Time:  Time Calculation  Start Time: 1500  Stop Time: 1555  Time Calculation (min): 55 min  OT Therapeutic Procedures Time Entry  Therapeutic Exercise Time Entry: 50    Insurance:  Visit number: 3 of 10  Authorization info: 10 authorized visits  Insurance Type: Payor: HUMANA MEDICARE / Plan: HUMANA GOLD CHOICE / Product Type: *No Product type* /    General:  Reason for visit: L radial head fx  Referred by: Quyen     Current Problem  1. Nondisplaced fracture of head of left radius, initial encounter for closed fracture  Follow Up In Occupational Therapy          Precautions   none      Subjective:   Patient reports \"I feel like I am loser.\"    Performing HEP?: Yes    Pain  Pain Assessment: 0-10  Pain Score: 0 - No pain=pre-TX  Post-TX pain=0/10    Objective:     AROM: L elbow E/F=-3/145    Physical Observation: intact  Edema: none    Sensory: intact  Numbness/Tingling: none    Treatments:     Modalities:      5 min  Modalities  Modalities Used: Yes  Modality 1: Untimed Hotpack    Therapeutic Exercise:   50 min  Therapeutic Exercise  Therapeutic Exercise Performed: Yes  Therapeutic Exercise Activity 1: elevated elbow series  Therapeutic Exercise Activity 2: FA sup/pron x 15  Therapeutic Exercise Activity 3: Wrist E/F with FA sup/pron x 15 reps each  Therapeutic Exercise Activity 4: juxacizer x 5 reps  Therapeutic Exercise Activity 5: ergometer x 5 mins  Therapeutic Exercise Activity 6: standing dowel shelia elbow E/F with thumb up, palm up, and palm down        Assessment: Improved functional L elbow extension and flexion this p.m.        Plan: Continue with plan of care 1-2x/wk with incorporation of strengthening at next visit.        Rachel Hill, OT  "

## 2024-05-03 ENCOUNTER — TREATMENT (OUTPATIENT)
Dept: OCCUPATIONAL THERAPY | Facility: HOSPITAL | Age: 69
End: 2024-05-03
Payer: MEDICARE

## 2024-05-03 DIAGNOSIS — S52.125A NONDISPLACED FRACTURE OF HEAD OF LEFT RADIUS, INITIAL ENCOUNTER FOR CLOSED FRACTURE: ICD-10-CM

## 2024-05-03 DIAGNOSIS — S52.125A CLOSED NONDISPLACED FRACTURE OF HEAD OF LEFT RADIUS: Primary | ICD-10-CM

## 2024-05-03 PROCEDURE — 97110 THERAPEUTIC EXERCISES: CPT | Mod: GO | Performed by: OCCUPATIONAL THERAPIST

## 2024-05-03 ASSESSMENT — PAIN - FUNCTIONAL ASSESSMENT: PAIN_FUNCTIONAL_ASSESSMENT: 0-10

## 2024-05-03 ASSESSMENT — PAIN SCALES - GENERAL: PAINLEVEL_OUTOF10: 0 - NO PAIN

## 2024-05-03 NOTE — PROGRESS NOTES
"    Occupational Therapy  Occupational Therapy Treatment    Patient Name: Stephany Lara  MRN: [id number]  Today's Date: 5/3/2024  Time Calculation  Start Time: 1500  Stop Time: 1545  Time Calculation (min): 45 min        Time:  Time Calculation  Start Time: 1500  Stop Time: 1545  Time Calculation (min): 45 min  OT Therapeutic Procedures Time Entry  Therapeutic Exercise Time Entry: 40    Insurance:  Visit number: 4 of 10  Authorization info: 10 authorized visits   Insurance Type: Payor: HUMANA MEDICARE / Plan: HUMANA GOLD CHOICE / Product Type: *No Product type* /    General:  Reason for visit: L radial head fx  Referred by: Yvonne     Current Problem  1. Closed nondisplaced fracture of head of left radius        2. Nondisplaced fracture of head of left radius, initial encounter for closed fracture  Follow Up In Occupational Therapy          Precautions   none      Subjective:   Patient reports \"I have been going back to the gym and feel really good.\"    Performing HEP?: Yes    Pain  Pain Assessment: 0-10  Pain Score: 0 - No pain=pre-tx  Post-tx pain=0/10    Objective:     Strength: L elbow ext=4/5, flex=4/5    Physical Observation: intact   Edema: none    Sensory: intact  Numbness/Tingling: none    Treatments:     Modalities:      5 min  Modalities  Modalities Used: Yes  Modality 1: Untimed Hotpack    Therapeutic Exercise:   40 min  Therapeutic Exercise  Therapeutic Exercise Performed: Yes  Therapeutic Exercise Activity 1: elevated elbow series x  Therapeutic Exercise Activity 2: FA sup/pron x 15  Therapeutic Exercise Activity 3: Wrist E/F with FA sup/pron x 15 reps each with and without a 1# weight.  Therapeutic Exercise Activity 4: juxacizer x 5 reps  Therapeutic Exercise Activity 5: ergometer x 6 mins      Assessment: Good tolerance with strength initiation.        Plan: Continue with plan of care with ? Discharge following next visit.       Rachel Hill, OT  "

## 2024-05-10 ENCOUNTER — TREATMENT (OUTPATIENT)
Dept: OCCUPATIONAL THERAPY | Facility: HOSPITAL | Age: 69
End: 2024-05-10
Payer: MEDICARE

## 2024-05-10 DIAGNOSIS — S52.125A NONDISPLACED FRACTURE OF HEAD OF LEFT RADIUS, INITIAL ENCOUNTER FOR CLOSED FRACTURE: ICD-10-CM

## 2024-05-10 PROCEDURE — 97110 THERAPEUTIC EXERCISES: CPT | Mod: GO | Performed by: OCCUPATIONAL THERAPIST

## 2024-05-10 ASSESSMENT — PAIN - FUNCTIONAL ASSESSMENT: PAIN_FUNCTIONAL_ASSESSMENT: 0-10

## 2024-05-10 ASSESSMENT — PAIN SCALES - GENERAL: PAINLEVEL_OUTOF10: 0 - NO PAIN

## 2024-05-10 NOTE — PROGRESS NOTES
"    Occupational Therapy  Occupational Therapy Treatment    Patient Name: Stephany Lara  MRN: [id number]  Today's Date: 5/10/2024  Time Calculation  Start Time: 1500  Stop Time: 1555  Time Calculation (min): 55 min        Time:  Time Calculation  Start Time: 1500  Stop Time: 1555  Time Calculation (min): 55 min  OT Therapeutic Procedures Time Entry  Therapeutic Exercise Time Entry: 55    Insurance:  Visit number: 5 of 5  with evaluation   Authorization info: 10 authorized visits  Insurance Type: Payor: HUMANA MEDICARE / Plan: HUMANA GOLD CHOICE / Product Type: *No Product type* /    General:  Reason for visit: L radial head FX  Referred by: Yvonne     Current Problem  1. Nondisplaced fracture of head of left radius, initial encounter for closed fracture  Follow Up In Occupational Therapy          Precautions   none      Subjective:   Patient reports \"I can do everything.\"    Performing HEP?: Yes    Pain  Pain Assessment: 0-10  Pain Score: 0 - No pain=pre-tx  Post-tx=0/10      Objective:     AROM: L elbow E/F=0/WFL    Physical Observation: intact   Edema: none    Sensory: intact  Numbness/Tingling: none    Treatments:     Therapeutic Exercise:   55 min  Therapeutic Exercise  Therapeutic Exercise Performed: Yes  Therapeutic Exercise Activity 1: elevated elbow series x 15 reps with and without a 1# weight  Therapeutic Exercise Activity 2: FA sup/pron x 15 reps  Therapeutic Exercise Activity 3: juxacizer x 5 reps  Therapeutic Exercise Activity 4: ergometer x 6 mins  Therapeutic Exercise Activity 5: yellow theraband x 10 reps each with thumb up, palm up, and palm down        Assessment: patient is independent with all ADL's.  Stephany has met all of her goals.       Plan: Discharge with follow-up as needed.        Rachel Hill, OT  "

## 2024-05-14 ENCOUNTER — OFFICE VISIT (OUTPATIENT)
Dept: OBSTETRICS AND GYNECOLOGY | Facility: CLINIC | Age: 69
End: 2024-05-14
Payer: MEDICARE

## 2024-05-14 RX ORDER — OMEPRAZOLE 40 MG/1
40 CAPSULE, DELAYED RELEASE ORAL
COMMUNITY
Start: 2018-08-29

## 2024-05-14 ASSESSMENT — PAIN SCALES - GENERAL: PAINLEVEL: 0-NO PAIN

## 2024-10-31 ENCOUNTER — APPOINTMENT (OUTPATIENT)
Dept: PRIMARY CARE | Facility: CLINIC | Age: 69
End: 2024-10-31
Payer: MEDICARE

## 2024-10-31 VITALS
DIASTOLIC BLOOD PRESSURE: 76 MMHG | SYSTOLIC BLOOD PRESSURE: 125 MMHG | TEMPERATURE: 97 F | BODY MASS INDEX: 27.96 KG/M2 | HEART RATE: 72 BPM | WEIGHT: 168 LBS

## 2024-10-31 DIAGNOSIS — R55 VASOVAGAL SYNCOPE: Primary | ICD-10-CM

## 2024-10-31 DIAGNOSIS — I10 BENIGN ESSENTIAL HYPERTENSION: ICD-10-CM

## 2024-10-31 PROCEDURE — 3078F DIAST BP <80 MM HG: CPT | Performed by: INTERNAL MEDICINE

## 2024-10-31 PROCEDURE — 3074F SYST BP LT 130 MM HG: CPT | Performed by: INTERNAL MEDICINE

## 2024-10-31 PROCEDURE — 99213 OFFICE O/P EST LOW 20 MIN: CPT | Performed by: INTERNAL MEDICINE

## 2024-10-31 ASSESSMENT — ENCOUNTER SYMPTOMS
FREQUENCY: 0
NECK PAIN: 0
SINUS PAIN: 0
PALPITATIONS: 0
CONFUSION: 0
MYALGIAS: 0
DIARRHEA: 0
FATIGUE: 1
CHILLS: 0
HEADACHES: 0
BLOOD IN STOOL: 0
DYSURIA: 0
DIZZINESS: 0
SORE THROAT: 0
ARTHRALGIAS: 0
FEVER: 0
CONSTIPATION: 0
ABDOMINAL PAIN: 0
WEAKNESS: 0
SHORTNESS OF BREATH: 0
NERVOUS/ANXIOUS: 0
COUGH: 0
BACK PAIN: 0
LIGHT-HEADEDNESS: 1

## 2025-01-17 ENCOUNTER — TELEPHONE (OUTPATIENT)
Dept: PRIMARY CARE | Facility: CLINIC | Age: 70
End: 2025-01-17
Payer: MEDICARE

## 2025-01-17 DIAGNOSIS — J45.20 MILD INTERMITTENT ASTHMA, UNSPECIFIED WHETHER COMPLICATED (HHS-HCC): ICD-10-CM

## 2025-01-17 DIAGNOSIS — J45.20 MILD INTERMITTENT ASTHMA, UNSPECIFIED WHETHER COMPLICATED (HHS-HCC): Primary | ICD-10-CM

## 2025-01-17 RX ORDER — ALBUTEROL SULFATE 90 UG/1
2 INHALANT RESPIRATORY (INHALATION) EVERY 4 HOURS PRN
Qty: 8.5 G | Refills: 11 | Status: SHIPPED | OUTPATIENT
Start: 2025-01-17 | End: 2025-01-17 | Stop reason: SDUPTHER

## 2025-01-17 RX ORDER — ALBUTEROL SULFATE 90 UG/1
2 INHALANT RESPIRATORY (INHALATION) EVERY 4 HOURS PRN
Qty: 8.5 G | Refills: 11 | Status: SHIPPED | OUTPATIENT
Start: 2025-01-17 | End: 2026-01-17

## 2025-01-17 NOTE — TELEPHONE ENCOUNTER
Patient called and stated that she has a prescription for albuterol but it isnt anywhere in her med chart.

## 2025-01-24 ENCOUNTER — APPOINTMENT (OUTPATIENT)
Dept: PRIMARY CARE | Facility: CLINIC | Age: 70
End: 2025-01-24
Payer: MEDICARE

## 2025-01-24 VITALS
WEIGHT: 169 LBS | BODY MASS INDEX: 28.12 KG/M2 | SYSTOLIC BLOOD PRESSURE: 130 MMHG | HEART RATE: 72 BPM | DIASTOLIC BLOOD PRESSURE: 80 MMHG

## 2025-01-24 DIAGNOSIS — R53.81 MALAISE: ICD-10-CM

## 2025-01-24 DIAGNOSIS — J45.20 MILD INTERMITTENT ASTHMA, UNSPECIFIED WHETHER COMPLICATED (HHS-HCC): ICD-10-CM

## 2025-01-24 DIAGNOSIS — E55.9 VITAMIN D DEFICIENCY: ICD-10-CM

## 2025-01-24 DIAGNOSIS — Z12.31 ENCOUNTER FOR SCREENING MAMMOGRAM FOR MALIGNANT NEOPLASM OF BREAST: ICD-10-CM

## 2025-01-24 DIAGNOSIS — I10 BENIGN ESSENTIAL HYPERTENSION: Primary | ICD-10-CM

## 2025-01-24 DIAGNOSIS — E78.2 HYPERLIPEMIA, MIXED: ICD-10-CM

## 2025-01-24 PROCEDURE — 1170F FXNL STATUS ASSESSED: CPT | Performed by: INTERNAL MEDICINE

## 2025-01-24 PROCEDURE — 99214 OFFICE O/P EST MOD 30 MIN: CPT | Performed by: INTERNAL MEDICINE

## 2025-01-24 PROCEDURE — 1036F TOBACCO NON-USER: CPT | Performed by: INTERNAL MEDICINE

## 2025-01-24 PROCEDURE — 3079F DIAST BP 80-89 MM HG: CPT | Performed by: INTERNAL MEDICINE

## 2025-01-24 PROCEDURE — 1123F ACP DISCUSS/DSCN MKR DOCD: CPT | Performed by: INTERNAL MEDICINE

## 2025-01-24 PROCEDURE — G0439 PPPS, SUBSEQ VISIT: HCPCS | Performed by: INTERNAL MEDICINE

## 2025-01-24 PROCEDURE — 1160F RVW MEDS BY RX/DR IN RCRD: CPT | Performed by: INTERNAL MEDICINE

## 2025-01-24 PROCEDURE — 3075F SYST BP GE 130 - 139MM HG: CPT | Performed by: INTERNAL MEDICINE

## 2025-01-24 PROCEDURE — 1158F ADVNC CARE PLAN TLK DOCD: CPT | Performed by: INTERNAL MEDICINE

## 2025-01-24 PROCEDURE — 1159F MED LIST DOCD IN RCRD: CPT | Performed by: INTERNAL MEDICINE

## 2025-01-24 RX ORDER — METHYLPREDNISOLONE 4 MG/1
TABLET ORAL
Qty: 21 TABLET | Refills: 0 | Status: SHIPPED | OUTPATIENT
Start: 2025-01-24 | End: 2025-01-30

## 2025-01-24 ASSESSMENT — ENCOUNTER SYMPTOMS
DIAPHORESIS: 0
BRUISES/BLEEDS EASILY: 0
LIGHT-HEADEDNESS: 0
FREQUENCY: 0
FACIAL SWELLING: 0
ABDOMINAL PAIN: 0
MYALGIAS: 0
POLYPHAGIA: 0
COLOR CHANGE: 0
VOMITING: 0
DIZZINESS: 0
SINUS PRESSURE: 0
ADENOPATHY: 0
BACK PAIN: 0
EYE ITCHING: 0
EYE DISCHARGE: 0
WEAKNESS: 0
CHEST TIGHTNESS: 0
ANAL BLEEDING: 0
NECK PAIN: 0
POLYDIPSIA: 0
SHORTNESS OF BREATH: 0
COUGH: 0
NUMBNESS: 0
ABDOMINAL DISTENTION: 0
APPETITE CHANGE: 0
RHINORRHEA: 0
EYE REDNESS: 0
RECTAL PAIN: 0
CONSTIPATION: 0
TROUBLE SWALLOWING: 0
STRIDOR: 0
SEIZURES: 0
ACTIVITY CHANGE: 0
BLOOD IN STOOL: 0
FLANK PAIN: 0
FATIGUE: 0
SORE THROAT: 0
WHEEZING: 1
DYSURIA: 0
TREMORS: 0
WOUND: 0
CHILLS: 0
CHOKING: 0
VOICE CHANGE: 0
EYE PAIN: 0
NECK STIFFNESS: 0
NAUSEA: 0
FACIAL ASYMMETRY: 0
SINUS PAIN: 0
PHOTOPHOBIA: 0
SPEECH DIFFICULTY: 0
SLEEP DISTURBANCE: 0
DIARRHEA: 0
PALPITATIONS: 0
JOINT SWELLING: 0
ARTHRALGIAS: 0
HEMATURIA: 0
HEADACHES: 0
DIFFICULTY URINATING: 0

## 2025-01-24 ASSESSMENT — ACTIVITIES OF DAILY LIVING (ADL)
GROCERY_SHOPPING: INDEPENDENT
DOING_HOUSEWORK: INDEPENDENT
DRESSING: INDEPENDENT
TAKING_MEDICATION: INDEPENDENT
BATHING: INDEPENDENT
MANAGING_FINANCES: INDEPENDENT

## 2025-01-24 NOTE — PROGRESS NOTES
Subjective   Patient ID: Stephany Lara is a 69 y.o. female who presents for Medicare Annual Wellness Visit Subsequent.    Patient presents for wellness exam and follow-up.  She has been compliant with her diet and exercise.  She has been complaining of worsening asthma symptoms with the cold weather.  She reports occasional wheezing and coughing.  She denies any headaches, no dizziness, no sinus problems, no chest pain or shortness of breath.  She denies abdominal pain no nausea vomiting or diarrhea.  She reports no pain.         Review of Systems   Constitutional:  Negative for activity change, appetite change, chills, diaphoresis and fatigue.   HENT:  Negative for congestion, dental problem, drooling, ear discharge, ear pain, facial swelling, hearing loss, mouth sores, nosebleeds, postnasal drip, rhinorrhea, sinus pressure, sinus pain, sneezing, sore throat, tinnitus, trouble swallowing and voice change.    Eyes:  Negative for photophobia, pain, discharge, redness, itching and visual disturbance.   Respiratory:  Positive for wheezing. Negative for cough, choking, chest tightness, shortness of breath and stridor.    Cardiovascular:  Negative for chest pain, palpitations and leg swelling.   Gastrointestinal:  Negative for abdominal distention, abdominal pain, anal bleeding, blood in stool, constipation, diarrhea, nausea, rectal pain and vomiting.   Endocrine: Negative for cold intolerance, heat intolerance, polydipsia, polyphagia and polyuria.   Genitourinary:  Negative for decreased urine volume, difficulty urinating, dysuria, enuresis, flank pain, frequency, genital sores, hematuria and urgency.   Musculoskeletal:  Negative for arthralgias, back pain, gait problem, joint swelling, myalgias, neck pain and neck stiffness.   Skin:  Negative for color change, pallor, rash and wound.   Neurological:  Negative for dizziness, tremors, seizures, syncope, facial asymmetry, speech difficulty, weakness, light-headedness,  numbness and headaches.   Hematological:  Negative for adenopathy. Does not bruise/bleed easily.   Psychiatric/Behavioral:  Negative for sleep disturbance.        Objective   /80   Pulse 72   Wt 76.7 kg (169 lb)   BMI 28.12 kg/m²     Physical Exam  Constitutional:       Appearance: Normal appearance.   Cardiovascular:      Rate and Rhythm: Normal rate and regular rhythm.      Heart sounds: No murmur heard.     No gallop.   Pulmonary:      Effort: No respiratory distress.      Breath sounds: No wheezing or rales.   Abdominal:      General: There is no distension.      Palpations: There is no mass.      Tenderness: There is no abdominal tenderness. There is no guarding.   Musculoskeletal:      Right lower leg: No edema.      Left lower leg: No edema.   Neurological:      Mental Status: She is alert.         Assessment/Plan   Diagnoses and all orders for this visit:  Benign essential hypertension-stable off of medication.  Follow low-salt diet and exercise  -     Uric Acid; Future  -     Urinalysis with Reflex Microscopic; Future  -     Albumin-Creatinine Ratio, Urine Random; Future  -     Comprehensive Metabolic Panel; Future  Hyperlipemia, mixed-check a fast lipid profile  -     Lipid Panel; Future  Malaise  -     CBC and Auto Differential; Future  -     TSH with reflex to Free T4 if abnormal; Future  Vitamin D deficiency  -     Vitamin D 25-Hydroxy,Total (for eval of Vitamin D levels); Future  Encounter for screening mammogram for malignant neoplasm of breast  -     BI mammo bilateral screening tomosynthesis; Future  Mild intermittent asthma, unspecified whether complicated (HHS-HCC)-will treat with a Medrol Dosepak.  She will call if not better  -     methylPREDNISolone (Medrol Dospak) 4 mg tablets; Take as directed on package.  Health maintenance-colonoscopy has been done.  Will schedule mammogram.  Bone density has been done.  Eye and dental appointment has been done.  GYN appointment for Pap.  Advance  care planning discussed.

## 2025-01-24 NOTE — PATIENT INSTRUCTIONS
Please take medication as prescribed.  Call if not better.  Schedule your mammogram.  Follow-up in 6 months.  Obtain fasting blood work and urine.

## 2025-01-30 ENCOUNTER — TELEPHONE (OUTPATIENT)
Dept: PRIMARY CARE | Facility: CLINIC | Age: 70
End: 2025-01-30
Payer: MEDICARE

## 2025-01-30 DIAGNOSIS — E55.9 VITAMIN D DEFICIENCY: ICD-10-CM

## 2025-01-30 DIAGNOSIS — I10 BENIGN ESSENTIAL HYPERTENSION: Primary | ICD-10-CM

## 2025-01-30 DIAGNOSIS — R53.81 MALAISE: ICD-10-CM

## 2025-01-30 DIAGNOSIS — E78.2 HYPERLIPEMIA, MIXED: ICD-10-CM

## 2025-01-30 NOTE — TELEPHONE ENCOUNTER
Mrs Lara went to the lab on Monday and was unable to do labs. Can you send them labs through [facility] so she get labs done

## 2025-02-06 LAB
25(OH)D3+25(OH)D2 SERPL-MCNC: 85 NG/ML (ref 30–100)
ALBUMIN SERPL-MCNC: 4.1 G/DL (ref 3.6–5.1)
ALP SERPL-CCNC: 76 U/L (ref 37–153)
ALT SERPL-CCNC: 16 U/L (ref 6–29)
ANION GAP SERPL CALCULATED.4IONS-SCNC: 9 MMOL/L (CALC) (ref 7–17)
AST SERPL-CCNC: 17 U/L (ref 10–35)
BASOPHILS # BLD AUTO: 52 CELLS/UL (ref 0–200)
BASOPHILS NFR BLD AUTO: 0.8 %
BILIRUB SERPL-MCNC: 0.3 MG/DL (ref 0.2–1.2)
BUN SERPL-MCNC: 22 MG/DL (ref 7–25)
CALCIUM SERPL-MCNC: 9.5 MG/DL (ref 8.6–10.4)
CHLORIDE SERPL-SCNC: 105 MMOL/L (ref 98–110)
CHOLEST SERPL-MCNC: 240 MG/DL
CHOLEST/HDLC SERPL: 2.8 (CALC)
CO2 SERPL-SCNC: 25 MMOL/L (ref 20–32)
CREAT SERPL-MCNC: 1.17 MG/DL (ref 0.5–1.05)
EGFRCR SERPLBLD CKD-EPI 2021: 51 ML/MIN/1.73M2
EOSINOPHIL # BLD AUTO: 299 CELLS/UL (ref 15–500)
EOSINOPHIL NFR BLD AUTO: 4.6 %
ERYTHROCYTE [DISTWIDTH] IN BLOOD BY AUTOMATED COUNT: 13.4 % (ref 11–15)
GLUCOSE SERPL-MCNC: 86 MG/DL (ref 65–99)
HCT VFR BLD AUTO: 39.2 % (ref 35–45)
HDLC SERPL-MCNC: 87 MG/DL
HGB BLD-MCNC: 12.8 G/DL (ref 11.7–15.5)
LDLC SERPL CALC-MCNC: 136 MG/DL (CALC)
LYMPHOCYTES # BLD AUTO: 1963 CELLS/UL (ref 850–3900)
LYMPHOCYTES NFR BLD AUTO: 30.2 %
MCH RBC QN AUTO: 32.4 PG (ref 27–33)
MCHC RBC AUTO-ENTMCNC: 32.7 G/DL (ref 32–36)
MCV RBC AUTO: 99.2 FL (ref 80–100)
MONOCYTES # BLD AUTO: 696 CELLS/UL (ref 200–950)
MONOCYTES NFR BLD AUTO: 10.7 %
NEUTROPHILS # BLD AUTO: 3491 CELLS/UL (ref 1500–7800)
NEUTROPHILS NFR BLD AUTO: 53.7 %
NONHDLC SERPL-MCNC: 153 MG/DL (CALC)
PLATELET # BLD AUTO: 180 THOUSAND/UL (ref 140–400)
PMV BLD REES-ECKER: 12.3 FL (ref 7.5–12.5)
POTASSIUM SERPL-SCNC: 4.7 MMOL/L (ref 3.5–5.3)
PROT SERPL-MCNC: 7.5 G/DL (ref 6.1–8.1)
RBC # BLD AUTO: 3.95 MILLION/UL (ref 3.8–5.1)
SODIUM SERPL-SCNC: 139 MMOL/L (ref 135–146)
TRIGL SERPL-MCNC: 73 MG/DL
TSH SERPL-ACNC: 2.15 MIU/L (ref 0.4–4.5)
URATE SERPL-MCNC: 4.9 MG/DL (ref 2.5–7)
WBC # BLD AUTO: 6.5 THOUSAND/UL (ref 3.8–10.8)

## 2025-02-07 DIAGNOSIS — N30.00 ACUTE CYSTITIS WITHOUT HEMATURIA: ICD-10-CM

## 2025-02-07 DIAGNOSIS — I10 BENIGN ESSENTIAL HYPERTENSION: Primary | ICD-10-CM

## 2025-02-07 LAB
APPEARANCE UR: ABNORMAL
BACTERIA #/AREA URNS HPF: ABNORMAL /HPF
BILIRUB UR QL STRIP: NEGATIVE
COLOR UR: YELLOW
GLUCOSE UR QL STRIP: NEGATIVE
HGB UR QL STRIP: NEGATIVE
HYALINE CASTS #/AREA URNS LPF: ABNORMAL /LPF
KETONES UR QL STRIP: NEGATIVE
LEUKOCYTE ESTERASE UR QL STRIP: ABNORMAL
NITRITE UR QL STRIP: NEGATIVE
PH UR STRIP: 5.5 [PH] (ref 5–8)
PROT UR QL STRIP: NEGATIVE
RBC #/AREA URNS HPF: ABNORMAL /HPF
SERVICE CMNT-IMP: ABNORMAL
SP GR UR STRIP: 1.02 (ref 1–1.03)
SQUAMOUS #/AREA URNS HPF: ABNORMAL /HPF
WBC #/AREA URNS HPF: ABNORMAL /HPF

## 2025-02-25 ENCOUNTER — HOSPITAL ENCOUNTER (OUTPATIENT)
Dept: RADIOLOGY | Facility: CLINIC | Age: 70
Discharge: HOME | End: 2025-02-25
Payer: MEDICARE

## 2025-02-25 VITALS — WEIGHT: 169.09 LBS | BODY MASS INDEX: 28.17 KG/M2 | HEIGHT: 65 IN

## 2025-02-25 DIAGNOSIS — Z12.31 ENCOUNTER FOR SCREENING MAMMOGRAM FOR MALIGNANT NEOPLASM OF BREAST: ICD-10-CM

## 2025-02-25 PROCEDURE — 77067 SCR MAMMO BI INCL CAD: CPT | Performed by: RADIOLOGY

## 2025-02-25 PROCEDURE — 77063 BREAST TOMOSYNTHESIS BI: CPT

## 2025-02-25 PROCEDURE — 77063 BREAST TOMOSYNTHESIS BI: CPT | Performed by: RADIOLOGY

## 2025-02-26 LAB
ANION GAP SERPL CALCULATED.4IONS-SCNC: 6 MMOL/L (CALC) (ref 7–17)
APPEARANCE UR: CLEAR
BACTERIA #/AREA URNS HPF: ABNORMAL /HPF
BACTERIA UR CULT: ABNORMAL
BILIRUB UR QL STRIP: NEGATIVE
BUN SERPL-MCNC: 21 MG/DL (ref 7–25)
BUN/CREAT SERPL: ABNORMAL (CALC) (ref 6–22)
CALCIUM SERPL-MCNC: 9.6 MG/DL (ref 8.6–10.4)
CHLORIDE SERPL-SCNC: 106 MMOL/L (ref 98–110)
CO2 SERPL-SCNC: 27 MMOL/L (ref 20–32)
COLOR UR: YELLOW
CREAT SERPL-MCNC: 1.02 MG/DL (ref 0.5–1.05)
EGFRCR SERPLBLD CKD-EPI 2021: 60 ML/MIN/1.73M2
GLUCOSE SERPL-MCNC: 88 MG/DL (ref 65–99)
GLUCOSE UR QL STRIP: NEGATIVE
HGB UR QL STRIP: NEGATIVE
HYALINE CASTS #/AREA URNS LPF: ABNORMAL /LPF
KETONES UR QL STRIP: NEGATIVE
LEUKOCYTE ESTERASE UR QL STRIP: ABNORMAL
NITRITE UR QL STRIP: NEGATIVE
PH UR STRIP: 5.5 [PH] (ref 5–8)
POTASSIUM SERPL-SCNC: 4.5 MMOL/L (ref 3.5–5.3)
PROT UR QL STRIP: NEGATIVE
RBC #/AREA URNS HPF: ABNORMAL /HPF
SERVICE CMNT-IMP: ABNORMAL
SODIUM SERPL-SCNC: 139 MMOL/L (ref 135–146)
SP GR UR STRIP: 1.02 (ref 1–1.03)
SQUAMOUS #/AREA URNS HPF: ABNORMAL /HPF
WBC #/AREA URNS HPF: ABNORMAL /HPF

## 2025-07-05 ENCOUNTER — OFFICE VISIT (OUTPATIENT)
Dept: URGENT CARE | Age: 70
End: 2025-07-05
Payer: MEDICARE

## 2025-07-05 VITALS
WEIGHT: 163 LBS | TEMPERATURE: 97.8 F | BODY MASS INDEX: 27.16 KG/M2 | HEART RATE: 75 BPM | RESPIRATION RATE: 19 BRPM | SYSTOLIC BLOOD PRESSURE: 117 MMHG | OXYGEN SATURATION: 99 % | DIASTOLIC BLOOD PRESSURE: 75 MMHG | HEIGHT: 65 IN

## 2025-07-05 DIAGNOSIS — U07.1 COVID-19: Primary | ICD-10-CM

## 2025-07-05 DIAGNOSIS — R05.9 COUGH, UNSPECIFIED TYPE: ICD-10-CM

## 2025-07-05 DIAGNOSIS — Z87.09 HISTORY OF ASTHMA: ICD-10-CM

## 2025-07-05 LAB — POC SARS-COV-2 AG BINAX: ABNORMAL

## 2025-07-05 RX ORDER — PREDNISONE 20 MG/1
40 TABLET ORAL DAILY
Qty: 10 TABLET | Refills: 0 | Status: SHIPPED | OUTPATIENT
Start: 2025-07-05 | End: 2025-07-10

## 2025-07-05 RX ORDER — NIRMATRELVIR AND RITONAVIR 300-100 MG
3 KIT ORAL 2 TIMES DAILY
Qty: 30 TABLET | Refills: 0 | Status: SHIPPED | OUTPATIENT
Start: 2025-07-05 | End: 2025-07-10

## 2025-07-05 ASSESSMENT — PATIENT HEALTH QUESTIONNAIRE - PHQ9
2. FEELING DOWN, DEPRESSED OR HOPELESS: NOT AT ALL
1. LITTLE INTEREST OR PLEASURE IN DOING THINGS: NOT AT ALL
SUM OF ALL RESPONSES TO PHQ9 QUESTIONS 1 AND 2: 0

## 2025-07-05 ASSESSMENT — ENCOUNTER SYMPTOMS
COUGH: 1
LOSS OF SENSATION IN FEET: 0
FATIGUE: 1
DEPRESSION: 0
OCCASIONAL FEELINGS OF UNSTEADINESS: 0
APPETITE CHANGE: 1

## 2025-07-05 NOTE — PROGRESS NOTES
"Subjective   Patient ID: Stephany Lara is a 69 y.o. female. They present today with a chief complaint of COVID-19 Screening (Covid home test positive tired chills cough 2 days  ).    History of Present Illness  Pt presents for concern for covid-19. X 2 days with cough, poor appetite, fatigue. Took home covid test that was positive. Denies sick contacts. Hx of asthma, has not had to use rescue inhaler. No cough wheezing cp sob fever chills sore throat congestion ear pain abd pain dizziness nvd.       History provided by:  Patient      Past Medical History  Allergies as of 07/05/2025    (No Known Allergies)       Prescriptions Prior to Admission[1]     Medical History[2]    Surgical History[3]     reports that she has never smoked. She has never used smokeless tobacco. She reports that she does not currently use alcohol. She reports that she does not currently use drugs.    Review of Systems  Review of Systems   Constitutional:  Positive for appetite change and fatigue.   Respiratory:  Positive for cough.    All other systems reviewed and are negative.                                 Objective    Vitals:    07/05/25 0947   BP: 117/75   BP Location: Right arm   Patient Position: Sitting   BP Cuff Size: Small adult   Pulse: 75   Resp: 19   Temp: 36.6 °C (97.8 °F)   TempSrc: Oral   SpO2: 99%   Weight: 73.9 kg (163 lb)   Height: 1.651 m (5' 5\")     No LMP recorded. Patient is postmenopausal.    Physical Exam  Vitals and nursing note reviewed.   Constitutional:       General: She is not in acute distress.     Appearance: Normal appearance. She is not ill-appearing, toxic-appearing or diaphoretic.   HENT:      Head: Normocephalic and atraumatic.      Right Ear: Tympanic membrane, ear canal and external ear normal.      Left Ear: Tympanic membrane, ear canal and external ear normal.      Nose: Nose normal.      Mouth/Throat:      Mouth: Mucous membranes are moist.   Cardiovascular:      Rate and Rhythm: Normal rate and " regular rhythm.      Pulses: Normal pulses.      Heart sounds: No murmur heard.  Pulmonary:      Effort: No respiratory distress.      Breath sounds: No wheezing, rhonchi or rales.   Neurological:      Mental Status: She is alert.         Procedures    Point of Care Test & Imaging Results from this visit  Results for orders placed or performed in visit on 07/05/25   [facility] manually resulted   Result Value Ref Range    POC ABHISHEK-COV-2 AG Positive test for SARS-CoV-2 (antigen detected) (A) Presumptive negative test for SARS-CoV-2 (no antigen detected)      Imaging  No results found.    Cardiology, Vascular, and Other Imaging  No other imaging results found for the past 2 days      Diagnostic study results (if any) were reviewed by Dee Carreno PA-C.    Assessment/Plan   Allergies, medications, history, and pertinent labs/EKGs/Imaging reviewed by Dee Carreno PA-C.     Medical Decision Making  Will treat with paxlovid. Discussed potential side effects, risks vs benefits and pt wishes to proceed with treatment. Asthma is not currently exacerbated, I have sent prednisone to start if she feels worsening asthma sx. Discussed supportive care and quarantine precautions. Return as needed. ED precautions discussed. Pt understands, agrees to plan.     Orders and Diagnoses  Diagnoses and all orders for this visit:  COVID-19  -     nirmatrelvir-ritonavir (Paxlovid) 300 mg (150 mg x 2)-100 mg tablet therapy pack; Take 3 tablets by mouth 2 times a day for 5 days. Follow the instructions on the package  -     predniSONE (Deltasone) 20 mg tablet; Take 2 tablets (40 mg) by mouth once daily for 5 days.  Cough, unspecified type  -     POCT [facility] Covid-19 Ag Card manually resulted  History of asthma  -     nirmatrelvir-ritonavir (Paxlovid) 300 mg (150 mg x 2)-100 mg tablet therapy pack; Take 3 tablets by mouth 2 times a day for 5 days. Follow the instructions on the package  -     predniSONE (Deltasone)  20 mg tablet; Take 2 tablets (40 mg) by mouth once daily for 5 days.      Medical Admin Record      Patient disposition: Home    Electronically signed by Dee Carreno PA-C  4:11 PM           [1] (Not in a hospital admission)  [2] No past medical history on file.  [3]   Past Surgical History:  Procedure Laterality Date    BREAST BIOPSY Left 06/08/2015    Biopsy Breast Open    COLONOSCOPY  08/08/2018    Complete Colonoscopy    COLONOSCOPY  11/2022    rpt 11-27

## 2025-07-25 ENCOUNTER — APPOINTMENT (OUTPATIENT)
Dept: PRIMARY CARE | Facility: CLINIC | Age: 70
End: 2025-07-25
Payer: MEDICARE

## 2025-10-03 ENCOUNTER — APPOINTMENT (OUTPATIENT)
Dept: PRIMARY CARE | Facility: CLINIC | Age: 70
End: 2025-10-03
Payer: MEDICARE